# Patient Record
Sex: FEMALE | Race: WHITE | NOT HISPANIC OR LATINO | Employment: FULL TIME | URBAN - METROPOLITAN AREA
[De-identification: names, ages, dates, MRNs, and addresses within clinical notes are randomized per-mention and may not be internally consistent; named-entity substitution may affect disease eponyms.]

---

## 2023-05-25 ENCOUNTER — OFFICE VISIT (OUTPATIENT)
Dept: FAMILY MEDICINE CLINIC | Facility: CLINIC | Age: 58
End: 2023-05-25
Payer: COMMERCIAL

## 2023-05-25 VITALS
HEIGHT: 69 IN | DIASTOLIC BLOOD PRESSURE: 110 MMHG | TEMPERATURE: 97 F | WEIGHT: 202.4 LBS | SYSTOLIC BLOOD PRESSURE: 180 MMHG | HEART RATE: 72 BPM | BODY MASS INDEX: 29.98 KG/M2 | OXYGEN SATURATION: 96 %

## 2023-05-25 DIAGNOSIS — I10 PRIMARY HYPERTENSION: Primary | ICD-10-CM

## 2023-05-25 DIAGNOSIS — Z11.59 NEED FOR HEPATITIS C SCREENING TEST: ICD-10-CM

## 2023-05-25 LAB — HCV AB SER DONR QL: NORMAL

## 2023-05-25 PROCEDURE — 86803 HEPATITIS C AB TEST: CPT | Performed by: FAMILY MEDICINE

## 2023-05-25 PROCEDURE — 80053 COMPREHEN METABOLIC PANEL: CPT | Performed by: FAMILY MEDICINE

## 2023-05-25 PROCEDURE — 3077F SYST BP >= 140 MM HG: CPT | Performed by: FAMILY MEDICINE

## 2023-05-25 PROCEDURE — 84443 ASSAY THYROID STIM HORMONE: CPT | Performed by: FAMILY MEDICINE

## 2023-05-25 PROCEDURE — 3080F DIAST BP >= 90 MM HG: CPT | Performed by: FAMILY MEDICINE

## 2023-05-25 PROCEDURE — 85025 COMPLETE CBC W/AUTO DIFF WBC: CPT | Performed by: FAMILY MEDICINE

## 2023-05-25 PROCEDURE — 80061 LIPID PANEL: CPT | Performed by: FAMILY MEDICINE

## 2023-05-25 RX ORDER — AMLODIPINE BESYLATE 5 MG/1
5 TABLET ORAL DAILY
Qty: 90 TABLET | Refills: 0 | Status: SHIPPED | OUTPATIENT
Start: 2023-05-25

## 2023-05-25 NOTE — ASSESSMENT & PLAN NOTE
Blood pressure somewhat elevated here today.  We will recheck it 1 more time.  Upon recheck her blood pressure remained is equally as bad.  We will add some amlodipine onto her irbesartan.

## 2023-05-25 NOTE — PROGRESS NOTES
Chief Complaint   Patient presents with   • Follow-up     1 month    • Hypertension        Subjective     Amber Philip  has a past medical history of Hypertension.    Hypertension- she states she does check her blood pressure at home intermittently.  Most recently her systolic blood pressure is 150.  Is a little bit higher here today at 173/105.  She is taking her medication daily.      PHQ-2 Depression Screening  Little interest or pleasure in doing things?     Feeling down, depressed, or hopeless?     PHQ-2 Total Score     PHQ-9 Depression Screening  Little interest or pleasure in doing things?     Feeling down, depressed, or hopeless?     Trouble falling or staying asleep, or sleeping too much?     Feeling tired or having little energy?     Poor appetite or overeating?     Feeling bad about yourself - or that you are a failure or have let yourself or your family down?     Trouble concentrating on things, such as reading the newspaper or watching television?     Moving or speaking so slowly that other people could have noticed? Or the opposite - being so fidgety or restless that you have been moving around a lot more than usual?     Thoughts that you would be better off dead, or of hurting yourself in some way?     PHQ-9 Total Score     If you checked off any problems, how difficult have these problems made it for you to do your work, take care of things at home, or get along with other people?       No Known Allergies    Prior to Admission medications    Medication Sig Start Date End Date Taking? Authorizing Provider   irbesartan (Avapro) 150 MG tablet Take 1 tablet by mouth Daily. 4/20/23  Yes Cuong Hickman, DO        Patient Active Problem List   Diagnosis   • Encounter for medical examination to establish care   • Hypertension   • Screening for colon cancer   • Encounter for screening mammogram for malignant neoplasm of breast   • Tobacco abuse        History reviewed. No pertinent surgical  "history.    Social History     Socioeconomic History   • Marital status: Single   Tobacco Use   • Smoking status: Every Day     Packs/day: 0.50     Years: 30.00     Pack years: 15.00     Types: Cigarettes   Vaping Use   • Vaping Use: Never used   Substance and Sexual Activity   • Alcohol use: Yes     Alcohol/week: 7.0 standard drinks     Types: 7 Standard drinks or equivalent per week     Comment: weekends   • Drug use: Never   • Sexual activity: Defer       History reviewed. No pertinent family history.    Family history, surgical history, past medical history, Allergies and meds reviewed with patient today and updated in [facility] EMR.     ROS:  Review of Systems   Constitutional: Negative for fatigue.   Respiratory: Negative for cough, chest tightness, shortness of breath and wheezing.    Cardiovascular: Negative for chest pain and palpitations.   Neurological: Negative for headache.       OBJECTIVE:  Vitals:    05/25/23 1431 05/25/23 1445   BP: (!) 173/105 (!) 180/110   BP Location: Left arm Left arm   Patient Position: Sitting Sitting   Cuff Size:  Adult   Pulse: 72    Temp: 97 °F (36.1 °C)    SpO2: 96%    Weight: 91.8 kg (202 lb 6.4 oz)    Height: 175.3 cm (69\")      No results found.   Body mass index is 29.89 kg/m².  No LMP recorded. Patient is postmenopausal.    Physical Exam  Vitals and nursing note reviewed.   Constitutional:       General: She is not in acute distress.     Appearance: Normal appearance. She is obese.   HENT:      Head: Normocephalic.   Cardiovascular:      Rate and Rhythm: Normal rate and regular rhythm.      Heart sounds: Normal heart sounds. No murmur heard.  Pulmonary:      Effort: Pulmonary effort is normal.      Breath sounds: Normal breath sounds. No wheezing.   Neurological:      Mental Status: She is alert.         Procedures    No visits with results within 30 Day(s) from this visit.   Latest known visit with results is:   Admission on 09/29/2021, Discharged on 09/29/2021   Component " Date Value Ref Range Status   • SARS Antigen 09/29/2021 Detected (A)  Not Detected Final   • Internal Control 09/29/2021 Passed  Passed Final   • Lot Number 09/29/2021 [id number]   Final   • Expiration Date 09/29/2021 03/03/2023   Final       ASSESSMENT/ PLAN:    Diagnoses and all orders for this visit:    1. Primary hypertension (Primary)  Assessment & Plan:  Blood pressure somewhat elevated here today.  We will recheck it 1 more time.  Upon recheck her blood pressure remained is equally as bad.  We will add some amlodipine onto her irbesartan.        Orders Placed Today:     No orders of the defined types were placed in this encounter.       Management Plan:     An After Visit Summary was printed and given to the patient at discharge.    Follow-up: Return in about 4 weeks (around 6/22/2023) for Recheck.    Cuong Hickman DO 5/25/2023 14:49 EDT  This note was electronically signed.

## 2023-05-26 DIAGNOSIS — Z00.00 ROUTINE CHECK-UP: Primary | ICD-10-CM

## 2024-05-06 ENCOUNTER — OFFICE VISIT (OUTPATIENT)
Dept: FAMILY MEDICINE CLINIC | Facility: CLINIC | Age: 59
End: 2024-05-06
Payer: COMMERCIAL

## 2024-05-06 VITALS
OXYGEN SATURATION: 99 % | SYSTOLIC BLOOD PRESSURE: 199 MMHG | WEIGHT: 196 LBS | TEMPERATURE: 97.3 F | BODY MASS INDEX: 29.03 KG/M2 | HEIGHT: 69 IN | DIASTOLIC BLOOD PRESSURE: 140 MMHG | HEART RATE: 78 BPM

## 2024-05-06 DIAGNOSIS — I10 PRIMARY HYPERTENSION: Primary | ICD-10-CM

## 2024-05-06 PROCEDURE — 99213 OFFICE O/P EST LOW 20 MIN: CPT | Performed by: FAMILY MEDICINE

## 2024-05-06 RX ORDER — IRBESARTAN 300 MG/1
300 TABLET ORAL DAILY
Qty: 90 TABLET | Refills: 0 | Status: SHIPPED | OUTPATIENT
Start: 2024-05-06

## 2024-05-06 RX ORDER — AMLODIPINE BESYLATE 5 MG/1
5 TABLET ORAL DAILY
Qty: 90 TABLET | Refills: 0 | Status: SHIPPED | OUTPATIENT
Start: 2024-05-06

## 2024-06-06 ENCOUNTER — OFFICE VISIT (OUTPATIENT)
Dept: FAMILY MEDICINE CLINIC | Facility: CLINIC | Age: 59
End: 2024-06-06
Payer: COMMERCIAL

## 2024-06-06 VITALS
TEMPERATURE: 97.6 F | SYSTOLIC BLOOD PRESSURE: 150 MMHG | WEIGHT: 197 LBS | DIASTOLIC BLOOD PRESSURE: 90 MMHG | OXYGEN SATURATION: 98 % | HEIGHT: 69 IN | HEART RATE: 80 BPM | BODY MASS INDEX: 29.18 KG/M2

## 2024-06-06 DIAGNOSIS — I10 PRIMARY HYPERTENSION: Primary | ICD-10-CM

## 2024-06-06 PROCEDURE — 99213 OFFICE O/P EST LOW 20 MIN: CPT | Performed by: FAMILY MEDICINE

## 2024-06-06 RX ORDER — AMLODIPINE BESYLATE 10 MG/1
10 TABLET ORAL DAILY
Qty: 90 TABLET | Refills: 0 | Status: SHIPPED | OUTPATIENT
Start: 2024-06-06

## 2024-06-06 NOTE — ASSESSMENT & PLAN NOTE
Her blood pressure is improved although still somewhat elevated here today.  Will recheck it 1 more time.

## 2024-06-06 NOTE — PROGRESS NOTES
Chief Complaint   Patient presents with    Follow-up     4 week follow up     Hypertension        Subjective     Amber Jurado  has a past medical history of Hypertension.    Hypertension  This is a chronic problem. The current episode started more than 1 year ago. The problem has been improved since onset. The problem is uncontrolled. Pertinent negatives include no chest pain, headaches, palpitations or shortness of breath. Risk factors for coronary artery disease include obesity and post-menopausal state. Current antihypertension treatment includes angiotensin blockers and calcium channel blockers. The current treatment provides moderate improvement. There are no compliance problems.        PHQ-2 Depression Screening  Little interest or pleasure in doing things?     Feeling down, depressed, or hopeless?     PHQ-2 Total Score     PHQ-9 Depression Screening  Little interest or pleasure in doing things?     Feeling down, depressed, or hopeless?     Trouble falling or staying asleep, or sleeping too much?     Feeling tired or having little energy?     Poor appetite or overeating?     Feeling bad about yourself - or that you are a failure or have let yourself or your family down?     Trouble concentrating on things, such as reading the newspaper or watching television?     Moving or speaking so slowly that other people could have noticed? Or the opposite - being so fidgety or restless that you have been moving around a lot more than usual?     Thoughts that you would be better off dead, or of hurting yourself in some way?     PHQ-9 Total Score     If you checked off any problems, how difficult have these problems made it for you to do your work, take care of things at home, or get along with other people?       No Known Allergies    Prior to Admission medications    Medication Sig Start Date End Date Taking? Authorizing Provider   amLODIPine (NORVASC) 5 MG tablet Take 1 tablet by mouth Daily. 5/6/24  Yes Marylou  "Cuong Saldivar DO   irbesartan (Avapro) 300 MG tablet Take 1 tablet by mouth Daily. 5/6/24  Yes Cuong Hickman DO        Patient Active Problem List   Diagnosis    Encounter for medical examination to establish care    Hypertension    Screening for colon cancer    Encounter for screening mammogram for malignant neoplasm of breast    Tobacco abuse        History reviewed. No pertinent surgical history.    Social History     Socioeconomic History    Marital status: Single   Tobacco Use    Smoking status: Every Day     Current packs/day: 0.50     Average packs/day: 0.5 packs/day for 30.0 years (15.0 ttl pk-yrs)     Types: Cigarettes    Smokeless tobacco: Never   Vaping Use    Vaping status: Never Used   Substance and Sexual Activity    Alcohol use: Yes     Alcohol/week: 7.0 standard drinks of alcohol     Types: 7 Standard drinks or equivalent per week     Comment: weekends    Drug use: Never    Sexual activity: Defer       History reviewed. No pertinent family history.    Family history, surgical history, past medical history, Allergies and meds reviewed with patient today and updated in [employer] EMR.     ROS:  Review of Systems   Constitutional:  Negative for fatigue.   Respiratory:  Negative for cough, chest tightness, shortness of breath and wheezing.    Cardiovascular:  Negative for chest pain and palpitations.   Neurological:  Negative for headache.       OBJECTIVE:  Vitals:    06/06/24 1129 06/06/24 1149   BP: 157/75 150/90   BP Location: Left arm Left arm   Patient Position: Sitting Sitting   Cuff Size:  Adult   Pulse: 80    Temp: 97.6 °F (36.4 °C)    SpO2: 98%    Weight: 89.4 kg (197 lb)    Height: 175.3 cm (69\")      No results found.   Body mass index is 29.09 kg/m².  No LMP recorded. Patient is postmenopausal.    The 10-year ASCVD risk score (Vasquez CHAVEZ, et al., 2019) is: 13.5%    Values used to calculate the score:      Age: 58 years      Sex: Female      Is Non- : No      " Diabetic: No      Tobacco smoker: Yes      Systolic Blood Pressure: 150 mmHg      Is BP treated: Yes      HDL Cholesterol: 34 mg/dL      Total Cholesterol: 190 mg/dL     Physical Exam  Vitals and nursing note reviewed.   Constitutional:       General: She is not in acute distress.     Appearance: Normal appearance. She is obese.   HENT:      Head: Normocephalic.   Cardiovascular:      Rate and Rhythm: Normal rate and regular rhythm.      Heart sounds: Normal heart sounds. No murmur heard.  Pulmonary:      Effort: Pulmonary effort is normal.      Breath sounds: Normal breath sounds. No wheezing or rhonchi.   Neurological:      Mental Status: She is alert and oriented to person, place, and time.   Psychiatric:         Mood and Affect: Mood normal.         Thought Content: Thought content normal.         Judgment: Judgment normal.         Procedures    No visits with results within 30 Day(s) from this visit.   Latest known visit with results is:   Office Visit on 05/25/2023   Component Date Value Ref Range Status    Hepatitis C Ab 05/25/2023 Non-Reactive  Non-Reactive Final       ASSESSMENT/ PLAN:    Diagnoses and all orders for this visit:    1. Primary hypertension (Primary)  Assessment & Plan:  Her blood pressure is improved although still somewhat elevated here today.  Will recheck it 1 more time.          BMI is >= 25 and <30. (Overweight) The following options were offered after discussion;: exercise counseling/recommendations and nutrition counseling/recommendations      Orders Placed Today:     No orders of the defined types were placed in this encounter.       Management Plan:     An After Visit Summary was printed and given to the patient at discharge.    Follow-up: Return in about 2 months (around 8/6/2024) for Recheck.    Cuong Hickman DO 6/6/2024 11:51 EDT  This note was electronically signed.

## 2024-07-01 ENCOUNTER — TELEPHONE (OUTPATIENT)
Dept: FAMILY MEDICINE CLINIC | Facility: CLINIC | Age: 59
End: 2024-07-01

## 2024-07-01 RX ORDER — ALPRAZOLAM 0.25 MG/1
0.25 TABLET ORAL 2 TIMES DAILY PRN
Qty: 10 TABLET | Refills: 0 | Status: SHIPPED | OUTPATIENT
Start: 2024-07-01

## 2024-07-01 RX ORDER — ONDANSETRON 4 MG/1
4 TABLET, ORALLY DISINTEGRATING ORAL EVERY 8 HOURS PRN
Qty: 20 TABLET | Refills: 0 | Status: SHIPPED | OUTPATIENT
Start: 2024-07-01

## 2024-07-01 NOTE — TELEPHONE ENCOUNTER
Caller: SAY LAMA    Relationship:     Best call back number: [phone]     What medication are you requesting: XANAX    What are your current symptoms: NAUSEA VOMITING, PATIENTS  WAS KILLED THIS WEEK AT [facility]    How long have you been experiencing symptoms: 1 WEEK    Have you had these symptoms before:    [] Yes  [] No    Have you been treated for these symptoms before:   [] Yes  [] No    If a prescription is needed, what is your preferred pharmacy and phone number: [facility] [address] [place] [address] [address] [facility] [phone] [facility] [phone] FX      Additional notes:PATIENTS SISTER CALLED STATING THE PATIENT IS GOING THROUGH A ROUGH TIME AND NEEDS SOMETHING TO GET THE PATIENT THROUGH THIS HARD TIME.     PLEASE CALL TO CONFIRM

## 2024-07-09 RX ORDER — ALPRAZOLAM 0.25 MG/1
0.25 TABLET ORAL 2 TIMES DAILY PRN
Qty: 10 TABLET | Refills: 0 | OUTPATIENT
Start: 2024-07-09

## 2024-07-16 ENCOUNTER — OFFICE VISIT (OUTPATIENT)
Dept: FAMILY MEDICINE CLINIC | Facility: CLINIC | Age: 59
End: 2024-07-16
Payer: COMMERCIAL

## 2024-07-16 VITALS
TEMPERATURE: 98 F | SYSTOLIC BLOOD PRESSURE: 143 MMHG | HEIGHT: 69 IN | OXYGEN SATURATION: 96 % | HEART RATE: 120 BPM | BODY MASS INDEX: 27.25 KG/M2 | DIASTOLIC BLOOD PRESSURE: 94 MMHG | WEIGHT: 184 LBS

## 2024-07-16 DIAGNOSIS — J40 BRONCHITIS: ICD-10-CM

## 2024-07-16 DIAGNOSIS — I10 PRIMARY HYPERTENSION: Primary | ICD-10-CM

## 2024-07-16 DIAGNOSIS — F41.8 SITUATIONAL ANXIETY: ICD-10-CM

## 2024-07-16 LAB
AMPHET+METHAMPHET UR QL: NEGATIVE
AMPHETAMINE INTERNAL CONTROL: ABNORMAL
AMPHETAMINES UR QL: NEGATIVE
BARBITURATE INTERNAL CONTROL: ABNORMAL
BARBITURATES UR QL SCN: NEGATIVE
BENZODIAZ UR QL SCN: NEGATIVE
BENZODIAZEPINE INTERNAL CONTROL: ABNORMAL
BUPRENORPHINE INTERNAL CONTROL: ABNORMAL
BUPRENORPHINE SERPL-MCNC: NEGATIVE NG/ML
CANNABINOIDS SERPL QL: POSITIVE
COCAINE INTERNAL CONTROL: ABNORMAL
COCAINE UR QL: NEGATIVE
EXPIRATION DATE: ABNORMAL
Lab: ABNORMAL
MDMA (ECSTASY) INTERNAL CONTROL: ABNORMAL
MDMA UR QL SCN: NEGATIVE
METHADONE INTERNAL CONTROL: ABNORMAL
METHADONE UR QL SCN: NEGATIVE
METHAMPHETAMINE INTERNAL CONTROL: ABNORMAL
MORPHINE INTERNAL CONTROL: ABNORMAL
MORPHINE/OPIATES SCREEN, URINE: NEGATIVE
OXYCODONE INTERNAL CONTROL: ABNORMAL
OXYCODONE UR QL SCN: NEGATIVE
PCP UR QL SCN: NEGATIVE
PHENCYCLIDINE INTERNAL CONTROL: ABNORMAL
THC INTERNAL CONTROL: ABNORMAL

## 2024-07-16 PROCEDURE — 80305 DRUG TEST PRSMV DIR OPT OBS: CPT | Performed by: FAMILY MEDICINE

## 2024-07-16 PROCEDURE — 99214 OFFICE O/P EST MOD 30 MIN: CPT | Performed by: FAMILY MEDICINE

## 2024-07-16 RX ORDER — HYDROCHLOROTHIAZIDE 12.5 MG/1
12.5 TABLET ORAL DAILY
Qty: 90 TABLET | Refills: 0 | Status: SHIPPED | OUTPATIENT
Start: 2024-07-16

## 2024-07-16 RX ORDER — ALPRAZOLAM 0.5 MG/1
0.5 TABLET ORAL 2 TIMES DAILY PRN
Qty: 30 TABLET | Refills: 1 | Status: SHIPPED | OUTPATIENT
Start: 2024-07-16

## 2024-07-16 NOTE — PROGRESS NOTES
Chief Complaint   Patient presents with    Follow-up     1 month     Hypertension        Subjective     Amber Jurado  has a past medical history of Hypertension.    Hypertension-she does not check her blood pressure at home.  She states her home blood pressure readings have been a little high in the 140s.    Acute anxiety-she states in July 25 her  had a work accident and tragically passed away.  She has been obviously distraught and more anxious since then.  Acutely I did give her some alprazolam.  She states that 0.25 mg was really not adequate but 0.5 mg was more effective with her acute anxiety.        PHQ-2 Depression Screening  Little interest or pleasure in doing things?     Feeling down, depressed, or hopeless?     PHQ-2 Total Score     PHQ-9 Depression Screening  Little interest or pleasure in doing things?     Feeling down, depressed, or hopeless?     Trouble falling or staying asleep, or sleeping too much?     Feeling tired or having little energy?     Poor appetite or overeating?     Feeling bad about yourself - or that you are a failure or have let yourself or your family down?     Trouble concentrating on things, such as reading the newspaper or watching television?     Moving or speaking so slowly that other people could have noticed? Or the opposite - being so fidgety or restless that you have been moving around a lot more than usual?     Thoughts that you would be better off dead, or of hurting yourself in some way?     PHQ-9 Total Score     If you checked off any problems, how difficult have these problems made it for you to do your work, take care of things at home, or get along with other people?       No Known Allergies    Prior to Admission medications    Medication Sig Start Date End Date Taking? Authorizing Provider   ALPRAZolam (XANAX) 0.25 MG tablet Take 1 tablet by mouth 2 (Two) Times a Day As Needed for Anxiety. 7/1/24  Yes Cuong Hickman, DO   amLODIPine (NORVASC) 10  MG tablet Take 1 tablet by mouth Daily. 6/6/24  Yes Cuong Hickman DO   irbesartan (Avapro) 300 MG tablet Take 1 tablet by mouth Daily. 5/6/24  Yes Cuong Hickman DO   ondansetron ODT (ZOFRAN-ODT) 4 MG disintegrating tablet Place 1 tablet on the tongue Every 8 (Eight) Hours As Needed for Nausea or Vomiting. 7/1/24  Yes Cuong Hickman DO        Patient Active Problem List   Diagnosis    Encounter for medical examination to establish care    Hypertension    Screening for colon cancer    Encounter for screening mammogram for malignant neoplasm of breast    Tobacco abuse    Bronchitis    Situational anxiety        History reviewed. No pertinent surgical history.    Social History     Socioeconomic History    Marital status: Single   Tobacco Use    Smoking status: Every Day     Current packs/day: 0.50     Average packs/day: 0.5 packs/day for 30.0 years (15.0 ttl pk-yrs)     Types: Cigarettes    Smokeless tobacco: Never   Vaping Use    Vaping status: Never Used   Substance and Sexual Activity    Alcohol use: Yes     Alcohol/week: 7.0 standard drinks of alcohol     Types: 7 Standard drinks or equivalent per week     Comment: weekends    Drug use: Never    Sexual activity: Defer       History reviewed. No pertinent family history.    Family history, surgical history, past medical history, Allergies and meds reviewed with patient today and updated in [employer] EMR.     ROS:  Review of Systems   Constitutional:  Negative for fatigue.   HENT:  Positive for congestion and postnasal drip.    Respiratory:  Positive for cough, chest tightness, shortness of breath and wheezing.    Cardiovascular:  Negative for chest pain and palpitations.   Neurological:  Negative for headache.   Psychiatric/Behavioral:  The patient is nervous/anxious.        OBJECTIVE:  Vitals:    07/16/24 0803   BP: 143/94   BP Location: Left arm   Patient Position: Sitting   Pulse: 120   Temp: 98 °F (36.7 °C)   SpO2: 96%   Weight: 83.5  "kg (184 lb)   Height: 175.3 cm (69\")     No results found.   Body mass index is 27.17 kg/m².  No LMP recorded. Patient is postmenopausal.    The 10-year ASCVD risk score (Vasquez CHAVEZ, et al., 2019) is: 12.3%    Values used to calculate the score:      Age: 58 years      Sex: Female      Is Non- : No      Diabetic: No      Tobacco smoker: Yes      Systolic Blood Pressure: 143 mmHg      Is BP treated: Yes      HDL Cholesterol: 34 mg/dL      Total Cholesterol: 190 mg/dL     Physical Exam  Vitals and nursing note reviewed.   Constitutional:       General: She is in acute distress.      Appearance: Normal appearance. She is normal weight.   HENT:      Head: Normocephalic.      Right Ear: Tympanic membrane, ear canal and external ear normal.      Left Ear: Tympanic membrane, ear canal and external ear normal.      Nose: Nose normal.      Mouth/Throat:      Mouth: Mucous membranes are moist.      Pharynx: Oropharynx is clear.   Eyes:      General: No scleral icterus.     Conjunctiva/sclera: Conjunctivae normal.      Pupils: Pupils are equal, round, and reactive to light.   Cardiovascular:      Rate and Rhythm: Normal rate and regular rhythm.      Pulses: Normal pulses.      Heart sounds: Normal heart sounds. No murmur heard.  Pulmonary:      Effort: Pulmonary effort is normal.      Breath sounds: Wheezing present. No rhonchi or rales.   Musculoskeletal:      Cervical back: Neck supple. No rigidity or tenderness.   Lymphadenopathy:      Cervical: No cervical adenopathy.   Skin:     General: Skin is warm and dry.      Coloration: Skin is not jaundiced.      Findings: No rash.   Neurological:      General: No focal deficit present.      Mental Status: She is alert and oriented to person, place, and time.   Psychiatric:         Mood and Affect: Mood normal.         Thought Content: Thought content normal.         Judgment: Judgment normal.         Procedures    No visits with results within 30 Day(s) from " this visit.   Latest known visit with results is:   Office Visit on 05/25/2023   Component Date Value Ref Range Status    Hepatitis C Ab 05/25/2023 Non-Reactive  Non-Reactive Final       ASSESSMENT/ PLAN:    Diagnoses and all orders for this visit:    1. Primary hypertension (Primary)  Assessment & Plan:  Her blood pressure remains a little elevated.  Will add some hydrochlorothiazide onto her regiment      2. Bronchitis  Assessment & Plan:  Instructed her that most bronchitis is typically tend to be viral.  Will give her a burst of steroids and an albuterol inhaler.      3. Situational anxiety  Assessment & Plan:  She remains very anxious and tearful related to her's 's sudden and unexpected death.  Will refill her alprazolam for now.  Instructed her that this should be a short-term medication that can be impairing and sedating and habit-forming as well.    Orders:  -     ALPRAZolam (XANAX) 0.5 MG tablet; Take 1 tablet by mouth 2 (Two) Times a Day As Needed for Anxiety.  Dispense: 30 tablet; Refill: 1  -     POC Medline 12 Panel Urine Drug Screen    Other orders  -     hydroCHLOROthiazide 12.5 MG tablet; Take 1 tablet by mouth Daily.  Dispense: 90 tablet; Refill: 0               Orders Placed Today:     New Medications Ordered This Visit   Medications    hydroCHLOROthiazide 12.5 MG tablet     Sig: Take 1 tablet by mouth Daily.     Dispense:  90 tablet     Refill:  0    ALPRAZolam (XANAX) 0.5 MG tablet     Sig: Take 1 tablet by mouth 2 (Two) Times a Day As Needed for Anxiety.     Dispense:  30 tablet     Refill:  1        Management Plan:     An After Visit Summary was printed and given to the patient at discharge.    Follow-up: Return in about 2 months (around 9/16/2024).    Cuong Hickman DO 7/16/2024 08:26 EDT  This note was electronically signed.

## 2024-07-16 NOTE — ASSESSMENT & PLAN NOTE
Her blood pressure remains a little elevated.  Will add some hydrochlorothiazide onto her regiment

## 2024-07-16 NOTE — ASSESSMENT & PLAN NOTE
Instructed her that most bronchitis is typically tend to be viral.  Will give her a burst of steroids and an albuterol inhaler.

## 2024-07-16 NOTE — ASSESSMENT & PLAN NOTE
She remains very anxious and tearful related to her's 's sudden and unexpected death.  Will refill her alprazolam for now.  Instructed her that this should be a short-term medication that can be impairing and sedating and habit-forming as well.

## 2024-07-17 ENCOUNTER — TELEPHONE (OUTPATIENT)
Dept: FAMILY MEDICINE CLINIC | Facility: CLINIC | Age: 59
End: 2024-07-17
Payer: COMMERCIAL

## 2024-07-17 RX ORDER — METHYLPREDNISOLONE 4 MG/1
TABLET ORAL
Qty: 21 TABLET | Refills: 0 | Status: SHIPPED | OUTPATIENT
Start: 2024-07-17

## 2024-07-17 RX ORDER — ALBUTEROL SULFATE 90 UG/1
2 AEROSOL, METERED RESPIRATORY (INHALATION) EVERY 6 HOURS PRN
Qty: 8 G | Refills: 0 | Status: SHIPPED | OUTPATIENT
Start: 2024-07-17

## 2024-07-17 NOTE — TELEPHONE ENCOUNTER
Patient called stating at her appointment yesterday you said you would send in a steriod and something for mucous and wheezing, however, they were not sent to her pharmacy.  Please advise.

## 2024-07-25 RX ORDER — IRBESARTAN 300 MG/1
300 TABLET ORAL DAILY
Qty: 90 TABLET | Refills: 0 | Status: SHIPPED | OUTPATIENT
Start: 2024-07-25

## 2024-08-13 ENCOUNTER — OFFICE VISIT (OUTPATIENT)
Dept: FAMILY MEDICINE CLINIC | Facility: CLINIC | Age: 59
End: 2024-08-13
Payer: COMMERCIAL

## 2024-08-13 VITALS
HEIGHT: 69 IN | SYSTOLIC BLOOD PRESSURE: 156 MMHG | HEART RATE: 101 BPM | DIASTOLIC BLOOD PRESSURE: 82 MMHG | TEMPERATURE: 97.2 F | BODY MASS INDEX: 26.66 KG/M2 | OXYGEN SATURATION: 98 % | WEIGHT: 180 LBS

## 2024-08-13 DIAGNOSIS — I10 PRIMARY HYPERTENSION: ICD-10-CM

## 2024-08-13 DIAGNOSIS — F41.8 SITUATIONAL ANXIETY: Primary | ICD-10-CM

## 2024-08-13 PROCEDURE — 99213 OFFICE O/P EST LOW 20 MIN: CPT | Performed by: FAMILY MEDICINE

## 2024-08-13 RX ORDER — ESCITALOPRAM OXALATE 10 MG/1
10 TABLET ORAL DAILY
Qty: 30 TABLET | Refills: 1 | Status: SHIPPED | OUTPATIENT
Start: 2024-08-13

## 2024-08-13 RX ORDER — ALPRAZOLAM 0.5 MG/1
0.5 TABLET ORAL 2 TIMES DAILY PRN
Qty: 30 TABLET | Refills: 1 | Status: SHIPPED | OUTPATIENT
Start: 2024-08-13

## 2024-08-13 RX ORDER — AMOXICILLIN 500 MG/1
1 CAPSULE ORAL 3 TIMES DAILY
COMMUNITY
Start: 2024-08-08

## 2024-08-13 NOTE — ASSESSMENT & PLAN NOTE
Her blood pressure remains elevated.  She is already on amlodipine 10 mg a day irbesartan 300 mg a day and hydrochlorothiazide 12.5.  I think a good portion of this is related to anxiety.  Will hold off on adding anything at this time to treat her anxiety.

## 2024-08-13 NOTE — PROGRESS NOTES
Chief Complaint   Patient presents with    Follow-up    Anxiety        Subjective     Amber Jurado  has a past medical history of Hypertension.    Anxiety disorder-her anxiety is rather persistent ever since her 's unexpected death.  She has not been able to return back to her house and stay overnight.  She states her symptoms are worse at nighttime.  Currently she is using the alprazolam twice daily.    Hypertension-she has not been checking her blood pressure outside the office.  It is significantly elevated here today.        PHQ-2 Depression Screening  Little interest or pleasure in doing things?     Feeling down, depressed, or hopeless?     PHQ-2 Total Score     PHQ-9 Depression Screening  Little interest or pleasure in doing things?     Feeling down, depressed, or hopeless?     Trouble falling or staying asleep, or sleeping too much?     Feeling tired or having little energy?     Poor appetite or overeating?     Feeling bad about yourself - or that you are a failure or have let yourself or your family down?     Trouble concentrating on things, such as reading the newspaper or watching television?     Moving or speaking so slowly that other people could have noticed? Or the opposite - being so fidgety or restless that you have been moving around a lot more than usual?     Thoughts that you would be better off dead, or of hurting yourself in some way?     PHQ-9 Total Score     If you checked off any problems, how difficult have these problems made it for you to do your work, take care of things at home, or get along with other people?       No Known Allergies    Prior to Admission medications    Medication Sig Start Date End Date Taking? Authorizing Provider   albuterol sulfate  (90 Base) MCG/ACT inhaler Inhale 2 puffs Every 6 (Six) Hours As Needed for Wheezing. 7/17/24  Yes Cuong Hickman, DO   ALPRAZolam (XANAX) 0.5 MG tablet Take 1 tablet by mouth 2 (Two) Times a Day As Needed for  Anxiety. 7/16/24  Yes Cuong Hickman DO   amLODIPine (NORVASC) 10 MG tablet Take 1 tablet by mouth Daily. 6/6/24  Yes Cuong Hickman DO   amoxicillin (AMOXIL) 500 MG capsule Take 1 capsule by mouth 3 times a day. 8/8/24  Yes Provider, MD Faustina   hydroCHLOROthiazide 12.5 MG tablet Take 1 tablet by mouth Daily. 7/16/24  Yes Cuong Hickman DO   irbesartan (AVAPRO) 300 MG tablet Take 1 tablet by mouth once daily 7/25/24  Yes Cuong Hickman DO   ondansetron ODT (ZOFRAN-ODT) 4 MG disintegrating tablet Place 1 tablet on the tongue Every 8 (Eight) Hours As Needed for Nausea or Vomiting. 7/1/24  Yes Cuong Hickman DO   methylPREDNISolone (MEDROL) 4 MG dose pack Take as directed on package instructions.  Patient not taking: Reported on 8/13/2024 7/17/24 8/13/24  Cuong Hickman DO        Patient Active Problem List   Diagnosis    Encounter for medical examination to establish care    Hypertension    Screening for colon cancer    Encounter for screening mammogram for malignant neoplasm of breast    Tobacco abuse    Bronchitis    Situational anxiety        History reviewed. No pertinent surgical history.    Social History     Socioeconomic History    Marital status: Single   Tobacco Use    Smoking status: Every Day     Current packs/day: 0.50     Average packs/day: 0.5 packs/day for 30.0 years (15.0 ttl pk-yrs)     Types: Cigarettes    Smokeless tobacco: Never   Vaping Use    Vaping status: Never Used   Substance and Sexual Activity    Alcohol use: Yes     Alcohol/week: 7.0 standard drinks of alcohol     Types: 7 Standard drinks or equivalent per week     Comment: weekends    Drug use: Never    Sexual activity: Defer       History reviewed. No pertinent family history.    Family history, surgical history, past medical history, Allergies and meds reviewed with patient today and updated in [employer] EMR.     ROS:  Review of Systems   Constitutional:  Negative for  "fatigue.   Respiratory:  Negative for cough, chest tightness, shortness of breath and wheezing.    Cardiovascular:  Negative for chest pain and palpitations.   Neurological:  Negative for headache.   Psychiatric/Behavioral:  Positive for sleep disturbance and depressed mood. The patient is nervous/anxious.        OBJECTIVE:  Vitals:    08/13/24 0802 08/13/24 0813   BP: (!) 169/115 156/82   BP Location: Left arm Right arm   Patient Position: Sitting Sitting   Cuff Size:  Adult   Pulse: 101    Temp: 97.2 °F (36.2 °C)    SpO2: 98%    Weight: 81.6 kg (180 lb)    Height: 175.3 cm (69\")      No results found.   Body mass index is 26.58 kg/m².  No LMP recorded. Patient is postmenopausal.    The 10-year ASCVD risk score (Vasquez CHAVEZ, et al., 2019) is: 14.5%    Values used to calculate the score:      Age: 58 years      Sex: Female      Is Non- : No      Diabetic: No      Tobacco smoker: Yes      Systolic Blood Pressure: 156 mmHg      Is BP treated: Yes      HDL Cholesterol: 34 mg/dL      Total Cholesterol: 190 mg/dL     Physical Exam  Vitals and nursing note reviewed.   Constitutional:       General: She is not in acute distress.     Appearance: Normal appearance. She is normal weight.   HENT:      Head: Normocephalic.   Cardiovascular:      Rate and Rhythm: Normal rate and regular rhythm.      Heart sounds: Normal heart sounds. No murmur heard.  Pulmonary:      Effort: Pulmonary effort is normal.      Breath sounds: Normal breath sounds. No wheezing or rhonchi.   Neurological:      Mental Status: She is alert and oriented to person, place, and time.   Psychiatric:         Mood and Affect: Mood normal.         Thought Content: Thought content normal.         Judgment: Judgment normal.         Procedures    Office Visit on 07/16/2024   Component Date Value Ref Range Status    Amphetamine Screen, Urine 07/16/2024 Negative  Negative Final    AMP INTERNAL CONTROL 07/16/2024 Passed  Passed Final    " Barbiturates Screen, Urine 07/16/2024 Negative  Negative Final    BARBITURATE INTERNAL CONTROL 07/16/2024 Passed  Passed Final    Buprenorphine, Screen, Urine 07/16/2024 Negative  Negative Final    BUPRENORPHINE INTERNAL CONTROL 07/16/2024 Passed  Passed Final    Benzodiazepine Screen, Urine 07/16/2024 Negative  Negative Final    BENZODIAZEPINE INTERNAL CONTROL 07/16/2024 Passed  Passed Final    Cocaine Screen, Urine 07/16/2024 Negative  Negative Final    COCAINE INTERNAL CONTROL 07/16/2024 Passed  Passed Final    MDMA (ECSTASY) 07/16/2024 Negative  Negative Final    MDMA (ECSTASY) INTERNAL CONTROL 07/16/2024 Passed  Passed Final    Methamphetamine, Ur 07/16/2024 Negative  Negative Final    METHAMPHETAMINE INTERNAL CONTROL 07/16/2024 Passed  Passed Final    Morphine/Opiates Screen, Urine 07/16/2024 Negative  Negative Final    MOR INTERNAL CONTROL 07/16/2024 Passed  Passed Final    Methadone Screen, Urine 07/16/2024 Negative  Negative Final    METHADONE INTERNAL CONTROL 07/16/2024 Passed  Passed Final    Oxycodone Screen, Urine 07/16/2024 Negative  Negative Final    OXYCODONE INTERNAL CONTROL 07/16/2024 Passed  Passed Final    Phencyclidine (PCP), Urine 07/16/2024 Negative  Negative Final    PHENCYCLIDINE INTERNAL CONTROL 07/16/2024 Passed  Passed Final    THC, Screen, Urine 07/16/2024 Positive (A)  Negative Final    THC INTERNAL CONTROL 07/16/2024 Passed  Passed Final    Lot Number 07/16/2024 [id number]   Final    Expiration Date 07/16/2024 11/12/2025   Final       ASSESSMENT/ PLAN:    Diagnoses and all orders for this visit:    1. Situational anxiety (Primary)  Assessment & Plan:  Her anxiety is rather persistent.  Will add something on a routine basis.  Hopefully then she can cut her alprazolam down to just only at bedtime.    Orders:  -     ALPRAZolam (XANAX) 0.5 MG tablet; Take 1 tablet by mouth 2 (Two) Times a Day As Needed for Anxiety.  Dispense: 30 tablet; Refill: 1    2. Primary hypertension  Assessment &  Plan:  Her blood pressure remains elevated.  She is already on amlodipine 10 mg a day irbesartan 300 mg a day and hydrochlorothiazide 12.5.  I think a good portion of this is related to anxiety.  Will hold off on adding anything at this time to treat her anxiety.      Other orders  -     escitalopram (Lexapro) 10 MG tablet; Take 1 tablet by mouth Daily.  Dispense: 30 tablet; Refill: 1               Orders Placed Today:     New Medications Ordered This Visit   Medications    escitalopram (Lexapro) 10 MG tablet     Sig: Take 1 tablet by mouth Daily.     Dispense:  30 tablet     Refill:  1    ALPRAZolam (XANAX) 0.5 MG tablet     Sig: Take 1 tablet by mouth 2 (Two) Times a Day As Needed for Anxiety.     Dispense:  30 tablet     Refill:  1        Management Plan:     An After Visit Summary was printed and given to the patient at discharge.    Follow-up: Return in about 4 weeks (around 9/10/2024) for Recheck.    Cuong Hickman DO 8/13/2024 08:21 EDT  This note was electronically signed.

## 2024-08-13 NOTE — ASSESSMENT & PLAN NOTE
Her anxiety is rather persistent.  Will add something on a routine basis.  Hopefully then she can cut her alprazolam down to just only at bedtime.

## 2024-09-10 ENCOUNTER — OFFICE VISIT (OUTPATIENT)
Dept: FAMILY MEDICINE CLINIC | Facility: CLINIC | Age: 59
End: 2024-09-10
Payer: COMMERCIAL

## 2024-09-10 VITALS
BODY MASS INDEX: 26.96 KG/M2 | WEIGHT: 182 LBS | TEMPERATURE: 97.4 F | HEART RATE: 92 BPM | HEIGHT: 69 IN | SYSTOLIC BLOOD PRESSURE: 165 MMHG | OXYGEN SATURATION: 98 % | DIASTOLIC BLOOD PRESSURE: 93 MMHG

## 2024-09-10 DIAGNOSIS — F41.8 SITUATIONAL ANXIETY: ICD-10-CM

## 2024-09-10 DIAGNOSIS — I10 PRIMARY HYPERTENSION: Primary | ICD-10-CM

## 2024-09-10 PROCEDURE — 99213 OFFICE O/P EST LOW 20 MIN: CPT | Performed by: FAMILY MEDICINE

## 2024-09-10 RX ORDER — AMLODIPINE BESYLATE 10 MG/1
10 TABLET ORAL DAILY
Qty: 90 TABLET | Refills: 1 | Status: SHIPPED | OUTPATIENT
Start: 2024-09-10

## 2024-09-10 RX ORDER — ALPRAZOLAM 0.5 MG
0.5 TABLET ORAL 2 TIMES DAILY PRN
Qty: 30 TABLET | Refills: 1 | Status: SHIPPED | OUTPATIENT
Start: 2024-09-10

## 2024-09-10 RX ORDER — IRBESARTAN 300 MG/1
300 TABLET ORAL DAILY
Qty: 90 TABLET | Refills: 1 | Status: SHIPPED | OUTPATIENT
Start: 2024-09-10

## 2024-09-10 RX ORDER — HYDROCHLOROTHIAZIDE 12.5 MG/1
12.5 TABLET ORAL DAILY
Qty: 90 TABLET | Refills: 1 | Status: SHIPPED | OUTPATIENT
Start: 2024-09-10

## 2024-09-10 RX ORDER — ESCITALOPRAM OXALATE 10 MG/1
10 TABLET ORAL DAILY
Qty: 90 TABLET | Refills: 1 | Status: SHIPPED | OUTPATIENT
Start: 2024-09-10

## 2024-09-10 NOTE — PROGRESS NOTES
Chief Complaint   Patient presents with    Follow-up     2 month     Hypertension        Subjective     Amber Jurado  has a past medical history of Hypertension.    Hypertension-she checks her blood pressure at home on a regular basis.  Her home blood pressure readings are typically all very good.  He remains somewhat elevated here today at 165/93.    Anxiety-her anxiety remains persistent.  She states though recently she has gone back home where she had been living with her sister.  This has provoked some increased anxiety with change in appetite and difficulty sleeping.        PHQ-2 Depression Screening  Little interest or pleasure in doing things?     Feeling down, depressed, or hopeless?     PHQ-2 Total Score     PHQ-9 Depression Screening  Little interest or pleasure in doing things?     Feeling down, depressed, or hopeless?     Trouble falling or staying asleep, or sleeping too much?     Feeling tired or having little energy?     Poor appetite or overeating?     Feeling bad about yourself - or that you are a failure or have let yourself or your family down?     Trouble concentrating on things, such as reading the newspaper or watching television?     Moving or speaking so slowly that other people could have noticed? Or the opposite - being so fidgety or restless that you have been moving around a lot more than usual?     Thoughts that you would be better off dead, or of hurting yourself in some way?     PHQ-9 Total Score     If you checked off any problems, how difficult have these problems made it for you to do your work, take care of things at home, or get along with other people?       No Known Allergies    Prior to Admission medications    Medication Sig Start Date End Date Taking? Authorizing Provider   albuterol sulfate  (90 Base) MCG/ACT inhaler Inhale 2 puffs Every 6 (Six) Hours As Needed for Wheezing. 7/17/24  Yes Cuong Hickman, DO   ALPRAZolam (XANAX) 0.5 MG tablet Take 1 tablet  by mouth 2 (Two) Times a Day As Needed for Anxiety. 8/13/24  Yes Cuong Hickman DO   amLODIPine (NORVASC) 10 MG tablet Take 1 tablet by mouth Daily. 6/6/24  Yes Cuong Hickman DO   escitalopram (Lexapro) 10 MG tablet Take 1 tablet by mouth Daily. 8/13/24  Yes Cuong Hickman DO   hydroCHLOROthiazide 12.5 MG tablet Take 1 tablet by mouth Daily. 7/16/24  Yes Cuong Hickman DO   irbesartan (AVAPRO) 300 MG tablet Take 1 tablet by mouth once daily 7/25/24  Yes Cuong Hickman DO   ondansetron ODT (ZOFRAN-ODT) 4 MG disintegrating tablet Place 1 tablet on the tongue Every 8 (Eight) Hours As Needed for Nausea or Vomiting. 7/1/24  Yes Cuong Hickman DO   amoxicillin (AMOXIL) 500 MG capsule Take 1 capsule by mouth 3 times a day.  Patient not taking: Reported on 9/10/2024 8/8/24 9/10/24  Provider, MD Faustina        Patient Active Problem List   Diagnosis    Encounter for medical examination to establish care    Hypertension    Screening for colon cancer    Encounter for screening mammogram for malignant neoplasm of breast    Tobacco abuse    Bronchitis    Situational anxiety        History reviewed. No pertinent surgical history.    Social History     Socioeconomic History    Marital status: Single   Tobacco Use    Smoking status: Every Day     Current packs/day: 0.50     Average packs/day: 0.5 packs/day for 30.0 years (15.0 ttl pk-yrs)     Types: Cigarettes    Smokeless tobacco: Never   Vaping Use    Vaping status: Never Used   Substance and Sexual Activity    Alcohol use: Yes     Alcohol/week: 7.0 standard drinks of alcohol     Types: 7 Standard drinks or equivalent per week     Comment: weekends    Drug use: Never    Sexual activity: Defer       History reviewed. No pertinent family history.    Family history, surgical history, past medical history, Allergies and meds reviewed with patient today and updated in [email or web address] EMR.     ROS:  Review of Systems  "  Constitutional:  Negative for fatigue.   Respiratory:  Negative for cough, chest tightness, shortness of breath and wheezing.    Cardiovascular:  Negative for chest pain and palpitations.   Neurological:  Negative for headache.   Psychiatric/Behavioral:  Positive for sleep disturbance. Negative for depressed mood. The patient is nervous/anxious.        OBJECTIVE:  Vitals:    09/10/24 0752   BP: 165/93   BP Location: Right arm   Patient Position: Sitting   Pulse: 92   Temp: 97.4 °F (36.3 °C)   SpO2: 98%   Weight: 82.6 kg (182 lb)   Height: 175.3 cm (69\")     No results found.   Body mass index is 26.88 kg/m².  No LMP recorded. Patient is postmenopausal.    The 10-year ASCVD risk score (Vasquez DK, et al., 2019) is: 16.8%    Values used to calculate the score:      Age: 59 years      Sex: Female      Is Non- : No      Diabetic: No      Tobacco smoker: Yes      Systolic Blood Pressure: 165 mmHg      Is BP treated: Yes      HDL Cholesterol: 34 mg/dL      Total Cholesterol: 190 mg/dL     Physical Exam  Vitals and nursing note reviewed.   Constitutional:       General: She is not in acute distress.     Appearance: Normal appearance. She is normal weight.   HENT:      Head: Normocephalic.   Cardiovascular:      Rate and Rhythm: Normal rate and regular rhythm.      Heart sounds: Normal heart sounds. No murmur heard.  Pulmonary:      Effort: Pulmonary effort is normal.      Breath sounds: Normal breath sounds. No wheezing or rhonchi.   Neurological:      Mental Status: She is alert and oriented to person, place, and time.   Psychiatric:         Mood and Affect: Mood normal.         Thought Content: Thought content normal.         Judgment: Judgment normal.         Procedures    No visits with results within 30 Day(s) from this visit.   Latest known visit with results is:   Office Visit on 07/16/2024   Component Date Value Ref Range Status    Amphetamine Screen, Urine 07/16/2024 Negative  Negative " Final    AMP INTERNAL CONTROL 07/16/2024 Passed  Passed Final    Barbiturates Screen, Urine 07/16/2024 Negative  Negative Final    BARBITURATE INTERNAL CONTROL 07/16/2024 Passed  Passed Final    Buprenorphine, Screen, Urine 07/16/2024 Negative  Negative Final    BUPRENORPHINE INTERNAL CONTROL 07/16/2024 Passed  Passed Final    Benzodiazepine Screen, Urine 07/16/2024 Negative  Negative Final    BENZODIAZEPINE INTERNAL CONTROL 07/16/2024 Passed  Passed Final    Cocaine Screen, Urine 07/16/2024 Negative  Negative Final    COCAINE INTERNAL CONTROL 07/16/2024 Passed  Passed Final    MDMA (ECSTASY) 07/16/2024 Negative  Negative Final    MDMA (ECSTASY) INTERNAL CONTROL 07/16/2024 Passed  Passed Final    Methamphetamine, Ur 07/16/2024 Negative  Negative Final    METHAMPHETAMINE INTERNAL CONTROL 07/16/2024 Passed  Passed Final    Morphine/Opiates Screen, Urine 07/16/2024 Negative  Negative Final    MOR INTERNAL CONTROL 07/16/2024 Passed  Passed Final    Methadone Screen, Urine 07/16/2024 Negative  Negative Final    METHADONE INTERNAL CONTROL 07/16/2024 Passed  Passed Final    Oxycodone Screen, Urine 07/16/2024 Negative  Negative Final    OXYCODONE INTERNAL CONTROL 07/16/2024 Passed  Passed Final    Phencyclidine (PCP), Urine 07/16/2024 Negative  Negative Final    PHENCYCLIDINE INTERNAL CONTROL 07/16/2024 Passed  Passed Final    THC, Screen, Urine 07/16/2024 Positive (A)  Negative Final    THC INTERNAL CONTROL 07/16/2024 Passed  Passed Final    Lot Number 07/16/2024 [id number]   Final    Expiration Date 07/16/2024 11/12/2025   Final       ASSESSMENT/ PLAN:    Diagnoses and all orders for this visit:    1. Primary hypertension (Primary)  Assessment & Plan:  Her home blood pressure readings are consistently very good.  Will continue her current medicines and she will continue to monitor closely.      2. Situational anxiety  Assessment & Plan:  Her anxiety is persistent.  She has only been on the Lexapro couple of weeks now.   Will watch a little bit further before adjusting her dosage.    Orders:  -     ALPRAZolam (XANAX) 0.5 MG tablet; Take 1 tablet by mouth 2 (Two) Times a Day As Needed for Anxiety.  Dispense: 30 tablet; Refill: 1    Other orders  -     amLODIPine (NORVASC) 10 MG tablet; Take 1 tablet by mouth Daily.  Dispense: 90 tablet; Refill: 1  -     escitalopram (Lexapro) 10 MG tablet; Take 1 tablet by mouth Daily.  Dispense: 90 tablet; Refill: 1  -     hydroCHLOROthiazide 12.5 MG tablet; Take 1 tablet by mouth Daily.  Dispense: 90 tablet; Refill: 1  -     irbesartan (AVAPRO) 300 MG tablet; Take 1 tablet by mouth Daily.  Dispense: 90 tablet; Refill: 1               Orders Placed Today:     New Medications Ordered This Visit   Medications    amLODIPine (NORVASC) 10 MG tablet     Sig: Take 1 tablet by mouth Daily.     Dispense:  90 tablet     Refill:  1    escitalopram (Lexapro) 10 MG tablet     Sig: Take 1 tablet by mouth Daily.     Dispense:  90 tablet     Refill:  1    hydroCHLOROthiazide 12.5 MG tablet     Sig: Take 1 tablet by mouth Daily.     Dispense:  90 tablet     Refill:  1    irbesartan (AVAPRO) 300 MG tablet     Sig: Take 1 tablet by mouth Daily.     Dispense:  90 tablet     Refill:  1    ALPRAZolam (XANAX) 0.5 MG tablet     Sig: Take 1 tablet by mouth 2 (Two) Times a Day As Needed for Anxiety.     Dispense:  30 tablet     Refill:  1        Management Plan:     An After Visit Summary was printed and given to the patient at discharge.    Follow-up: Return in about 3 months (around 12/10/2024) for Recheck.    Cuong Hickman,  9/10/2024 08:07 EDT  This note was electronically signed.

## 2024-09-10 NOTE — ASSESSMENT & PLAN NOTE
Her anxiety is persistent.  She has only been on the Lexapro couple of weeks now.  Will watch a little bit further before adjusting her dosage.

## 2024-09-10 NOTE — ASSESSMENT & PLAN NOTE
Her home blood pressure readings are consistently very good.  Will continue her current medicines and she will continue to monitor closely.

## 2024-10-04 DIAGNOSIS — F41.8 SITUATIONAL ANXIETY: ICD-10-CM

## 2024-10-07 RX ORDER — ALPRAZOLAM 0.5 MG
0.5 TABLET ORAL 2 TIMES DAILY PRN
Qty: 30 TABLET | Refills: 0 | Status: SHIPPED | OUTPATIENT
Start: 2024-10-07

## 2024-10-14 RX ORDER — HYDROCHLOROTHIAZIDE 12.5 MG/1
12.5 TABLET ORAL DAILY
Qty: 90 TABLET | Refills: 1 | Status: SHIPPED | OUTPATIENT
Start: 2024-10-14

## 2024-10-14 NOTE — TELEPHONE ENCOUNTER
[facility] IS TELLING PATIENT SHE DOES NOT HAVE A REFILL AND SHE IS OUT OF MEDICATION TODAY.    18-Mar-2020 14:02

## 2024-10-17 DIAGNOSIS — F41.8 SITUATIONAL ANXIETY: ICD-10-CM

## 2024-10-17 RX ORDER — ALPRAZOLAM 0.5 MG
0.5 TABLET ORAL 2 TIMES DAILY PRN
Qty: 30 TABLET | Refills: 0 | OUTPATIENT
Start: 2024-10-17

## 2024-10-21 DIAGNOSIS — F41.8 SITUATIONAL ANXIETY: ICD-10-CM

## 2024-10-23 NOTE — TELEPHONE ENCOUNTER
Caller: Amber Jurado    Relationship to patient: Self    Best call back number: [phone]    Patient is needing: PATIENT CALLING IN FOR UPDATE ABOUT THIS, LET PATIENT KNOW THERE IS A REQUEST THAT IS PENDING.

## 2024-10-24 DIAGNOSIS — F41.8 SITUATIONAL ANXIETY: ICD-10-CM

## 2024-10-24 RX ORDER — ALPRAZOLAM 0.5 MG
0.5 TABLET ORAL 2 TIMES DAILY PRN
Qty: 30 TABLET | Refills: 0 | Status: SHIPPED | OUTPATIENT
Start: 2024-10-24

## 2024-10-24 RX ORDER — ALPRAZOLAM 0.5 MG
0.5 TABLET ORAL 2 TIMES DAILY PRN
Qty: 30 TABLET | Refills: 0 | OUTPATIENT
Start: 2024-10-24

## 2024-11-06 DIAGNOSIS — F41.8 SITUATIONAL ANXIETY: ICD-10-CM

## 2024-11-13 RX ORDER — ALPRAZOLAM 0.5 MG
0.5 TABLET ORAL 2 TIMES DAILY PRN
Qty: 30 TABLET | Refills: 0 | Status: SHIPPED | OUTPATIENT
Start: 2024-11-13

## 2024-11-27 DIAGNOSIS — F41.8 SITUATIONAL ANXIETY: ICD-10-CM

## 2024-12-02 DIAGNOSIS — F41.8 SITUATIONAL ANXIETY: ICD-10-CM

## 2024-12-02 RX ORDER — ALPRAZOLAM 0.5 MG
0.5 TABLET ORAL 2 TIMES DAILY PRN
Qty: 30 TABLET | Refills: 0 | Status: SHIPPED | OUTPATIENT
Start: 2024-12-02

## 2024-12-10 ENCOUNTER — OFFICE VISIT (OUTPATIENT)
Dept: FAMILY MEDICINE CLINIC | Facility: CLINIC | Age: 59
End: 2024-12-10
Payer: COMMERCIAL

## 2024-12-10 VITALS
TEMPERATURE: 97.1 F | WEIGHT: 197 LBS | HEIGHT: 69 IN | SYSTOLIC BLOOD PRESSURE: 138 MMHG | BODY MASS INDEX: 29.18 KG/M2 | HEART RATE: 105 BPM | DIASTOLIC BLOOD PRESSURE: 70 MMHG | OXYGEN SATURATION: 98 %

## 2024-12-10 DIAGNOSIS — F41.8 SITUATIONAL ANXIETY: ICD-10-CM

## 2024-12-10 DIAGNOSIS — I10 PRIMARY HYPERTENSION: Primary | ICD-10-CM

## 2024-12-10 PROCEDURE — 99214 OFFICE O/P EST MOD 30 MIN: CPT | Performed by: FAMILY MEDICINE

## 2024-12-10 RX ORDER — ALPRAZOLAM 0.5 MG
0.5 TABLET ORAL 2 TIMES DAILY PRN
Qty: 30 TABLET | Refills: 0 | OUTPATIENT
Start: 2024-12-10

## 2024-12-10 RX ORDER — ESCITALOPRAM OXALATE 20 MG/1
20 TABLET ORAL DAILY
Qty: 90 TABLET | Refills: 1 | Status: SHIPPED | OUTPATIENT
Start: 2024-12-10

## 2024-12-10 RX ORDER — ALPRAZOLAM 0.5 MG
0.5 TABLET ORAL 2 TIMES DAILY PRN
Qty: 60 TABLET | Refills: 1 | Status: SHIPPED | OUTPATIENT
Start: 2024-12-10

## 2024-12-10 NOTE — ASSESSMENT & PLAN NOTE
Her anxiety is rather persistent.  We cautioned her about long-term use of the alprazolam.  Will try increasing up her Lexapro to 20 mg daily and see if he can control her anxiety better overall and hopefully titrate her off her alprazolam after the holidays

## 2024-12-10 NOTE — PROGRESS NOTES
Chief Complaint   Patient presents with    Follow-up     3 mo f/u    Anxiety    Hypertension        Subjective     Amber Jurado  has a past medical history of Hypertension.    Hypertension-she does check her blood pressure at home.  She states her home blood pressure readings are typically good.  Typically in the 130s over 80s.  Is little elevated here today at 144/92.    Anxiety-her anxiety is persistent.  She states that with the holidays though it is a little bit worse.  She typically takes the alprazolam twice daily.  She is still doing the escitalopram 10 mg daily.        PHQ-2 Depression Screening  Little interest or pleasure in doing things?     Feeling down, depressed, or hopeless?     PHQ-2 Total Score     PHQ-9 Depression Screening  Little interest or pleasure in doing things?     Feeling down, depressed, or hopeless?     Trouble falling or staying asleep, or sleeping too much?     Feeling tired or having little energy?     Poor appetite or overeating?     Feeling bad about yourself - or that you are a failure or have let yourself or your family down?     Trouble concentrating on things, such as reading the newspaper or watching television?     Moving or speaking so slowly that other people could have noticed? Or the opposite - being so fidgety or restless that you have been moving around a lot more than usual?     Thoughts that you would be better off dead, or of hurting yourself in some way?     PHQ-9 Total Score     If you checked off any problems, how difficult have these problems made it for you to do your work, take care of things at home, or get along with other people?       No Known Allergies    Prior to Admission medications    Medication Sig Start Date End Date Taking? Authorizing Provider   albuterol sulfate  (90 Base) MCG/ACT inhaler Inhale 2 puffs Every 6 (Six) Hours As Needed for Wheezing. 7/17/24  Yes Cuong Hickman, DO   ALPRAZolam (XANAX) 0.5 MG tablet Take 1 tablet by  mouth twice daily as needed for anxiety 12/2/24  Yes Cuong Hickman DO   amLODIPine (NORVASC) 10 MG tablet Take 1 tablet by mouth Daily. 9/10/24  Yes Cuong Hickman DO   escitalopram (Lexapro) 10 MG tablet Take 1 tablet by mouth Daily. 9/10/24  Yes Cuong Hickman DO   hydroCHLOROthiazide 12.5 MG tablet Take 1 tablet by mouth Daily. 10/14/24  Yes Cuong Hickman DO   irbesartan (AVAPRO) 300 MG tablet Take 1 tablet by mouth Daily. 9/10/24  Yes Cuong Hickman DO   ondansetron ODT (ZOFRAN-ODT) 4 MG disintegrating tablet Place 1 tablet on the tongue Every 8 (Eight) Hours As Needed for Nausea or Vomiting. 7/1/24  Yes Cuong Hickman DO        Patient Active Problem List   Diagnosis    Encounter for medical examination to establish care    Hypertension    Screening for colon cancer    Encounter for screening mammogram for malignant neoplasm of breast    Tobacco abuse    Bronchitis    Situational anxiety        No past surgical history on file.    Social History     Socioeconomic History    Marital status: Single   Tobacco Use    Smoking status: Every Day     Current packs/day: 0.50     Average packs/day: 0.5 packs/day for 30.0 years (15.0 ttl pk-yrs)     Types: Cigarettes    Smokeless tobacco: Never   Vaping Use    Vaping status: Never Used   Substance and Sexual Activity    Alcohol use: Yes     Alcohol/week: 7.0 standard drinks of alcohol     Types: 7 Standard drinks or equivalent per week     Comment: weekends    Drug use: Never    Sexual activity: Defer       History reviewed. No pertinent family history.    Family history, surgical history, past medical history, Allergies and meds reviewed with patient today and updated in [facility] EMR.     ROS:  Review of Systems   Constitutional:  Negative for fatigue.   Respiratory:  Negative for cough, chest tightness, shortness of breath and wheezing.    Cardiovascular:  Negative for chest pain and palpitations.  "  Psychiatric/Behavioral:  Negative for depressed mood. The patient is nervous/anxious.        OBJECTIVE:  Vitals:    12/10/24 0850 12/10/24 0940   BP: 144/92 138/70   BP Location: Right arm Right arm   Patient Position: Sitting Sitting   Cuff Size: Large Adult Adult   Pulse: 105    Temp: 97.1 °F (36.2 °C)    TempSrc: Temporal    SpO2: 98%    Weight: 89.4 kg (197 lb)    Height: 175.3 cm (69\")      No results found.   Body mass index is 29.09 kg/m².  No LMP recorded. Patient is postmenopausal.    The 10-year ASCVD risk score (Vasquez CHAVEZ, et al., 2019) is: 12%    Values used to calculate the score:      Age: 59 years      Sex: Female      Is Non- : No      Diabetic: No      Tobacco smoker: Yes      Systolic Blood Pressure: 138 mmHg      Is BP treated: Yes      HDL Cholesterol: 34 mg/dL      Total Cholesterol: 190 mg/dL     Physical Exam  Vitals and nursing note reviewed.   Constitutional:       General: She is not in acute distress.     Appearance: Normal appearance. She is obese.   HENT:      Head: Normocephalic.   Cardiovascular:      Rate and Rhythm: Normal rate and regular rhythm.      Heart sounds: Normal heart sounds. No murmur heard.  Pulmonary:      Effort: Pulmonary effort is normal.      Breath sounds: Normal breath sounds. No wheezing or rhonchi.   Neurological:      Mental Status: She is alert and oriented to person, place, and time.   Psychiatric:         Mood and Affect: Mood normal.         Behavior: Behavior normal.         Thought Content: Thought content normal.         Judgment: Judgment normal.         Procedures    No visits with results within 30 Day(s) from this visit.   Latest known visit with results is:   Office Visit on 07/16/2024   Component Date Value Ref Range Status    Amphetamine Screen, Urine 07/16/2024 Negative  Negative Final    AMP INTERNAL CONTROL 07/16/2024 Passed  Passed Final    Barbiturates Screen, Urine 07/16/2024 Negative  Negative Final    BARBITURATE " INTERNAL CONTROL 07/16/2024 Passed  Passed Final    Buprenorphine, Screen, Urine 07/16/2024 Negative  Negative Final    BUPRENORPHINE INTERNAL CONTROL 07/16/2024 Passed  Passed Final    Benzodiazepine Screen, Urine 07/16/2024 Negative  Negative Final    BENZODIAZEPINE INTERNAL CONTROL 07/16/2024 Passed  Passed Final    Cocaine Screen, Urine 07/16/2024 Negative  Negative Final    COCAINE INTERNAL CONTROL 07/16/2024 Passed  Passed Final    MDMA (ECSTASY) 07/16/2024 Negative  Negative Final    MDMA (ECSTASY) INTERNAL CONTROL 07/16/2024 Passed  Passed Final    Methamphetamine, Ur 07/16/2024 Negative  Negative Final    METHAMPHETAMINE INTERNAL CONTROL 07/16/2024 Passed  Passed Final    Morphine/Opiates Screen, Urine 07/16/2024 Negative  Negative Final    MOR INTERNAL CONTROL 07/16/2024 Passed  Passed Final    Methadone Screen, Urine 07/16/2024 Negative  Negative Final    METHADONE INTERNAL CONTROL 07/16/2024 Passed  Passed Final    Oxycodone Screen, Urine 07/16/2024 Negative  Negative Final    OXYCODONE INTERNAL CONTROL 07/16/2024 Passed  Passed Final    Phencyclidine (PCP), Urine 07/16/2024 Negative  Negative Final    PHENCYCLIDINE INTERNAL CONTROL 07/16/2024 Passed  Passed Final    THC, Screen, Urine 07/16/2024 Positive (A)  Negative Final    THC INTERNAL CONTROL 07/16/2024 Passed  Passed Final    Lot Number 07/16/2024 [id number]   Final    Expiration Date 07/16/2024 11/12/2025   Final       ASSESSMENT/ PLAN:    Diagnoses and all orders for this visit:    1. Primary hypertension (Primary)  Assessment & Plan:  Her blood pressure is a little elevated here today.  Her home blood pressure readings are better.  Will recheck it 1 more time      2. Situational anxiety  Assessment & Plan:  Her anxiety is rather persistent.  We cautioned her about long-term use of the alprazolam.  Will try increasing up her Lexapro to 20 mg daily and see if he can control her anxiety better overall and hopefully titrate her off her alprazolam  after the holidays    Orders:  -     ALPRAZolam (XANAX) 0.5 MG tablet; Take 1 tablet by mouth 2 (Two) Times a Day As Needed for Anxiety. for anxiety  Dispense: 60 tablet; Refill: 1    Other orders  -     escitalopram (Lexapro) 20 MG tablet; Take 1 tablet by mouth Daily.  Dispense: 90 tablet; Refill: 1               Orders Placed Today:     New Medications Ordered This Visit   Medications    escitalopram (Lexapro) 20 MG tablet     Sig: Take 1 tablet by mouth Daily.     Dispense:  90 tablet     Refill:  1    ALPRAZolam (XANAX) 0.5 MG tablet     Sig: Take 1 tablet by mouth 2 (Two) Times a Day As Needed for Anxiety. for anxiety     Dispense:  60 tablet     Refill:  1        Management Plan:     An After Visit Summary was printed and given to the patient at discharge.    Follow-up: Return in about 3 months (around 3/10/2025) for Recheck.    Cuong Hickman DO 12/10/2024 09:42 EST  This note was electronically signed.

## 2024-12-10 NOTE — ASSESSMENT & PLAN NOTE
Her blood pressure is a little elevated here today.  Her home blood pressure readings are better.  Will recheck it 1 more time

## 2025-02-11 DIAGNOSIS — F41.8 SITUATIONAL ANXIETY: ICD-10-CM

## 2025-02-11 RX ORDER — ALPRAZOLAM 0.5 MG
0.5 TABLET ORAL 2 TIMES DAILY PRN
Qty: 60 TABLET | Refills: 0 | Status: SHIPPED | OUTPATIENT
Start: 2025-02-11

## 2025-03-10 RX ORDER — AMLODIPINE BESYLATE 10 MG/1
10 TABLET ORAL DAILY
Qty: 90 TABLET | Refills: 0 | Status: SHIPPED | OUTPATIENT
Start: 2025-03-10

## 2025-03-12 DIAGNOSIS — F41.8 SITUATIONAL ANXIETY: ICD-10-CM

## 2025-03-12 RX ORDER — ALPRAZOLAM 0.5 MG
0.5 TABLET ORAL 2 TIMES DAILY PRN
Qty: 60 TABLET | Refills: 0 | Status: SHIPPED | OUTPATIENT
Start: 2025-03-12

## 2025-03-18 ENCOUNTER — OFFICE VISIT (OUTPATIENT)
Dept: FAMILY MEDICINE CLINIC | Facility: CLINIC | Age: 60
End: 2025-03-18
Payer: COMMERCIAL

## 2025-03-18 VITALS
OXYGEN SATURATION: 96 % | DIASTOLIC BLOOD PRESSURE: 80 MMHG | BODY MASS INDEX: 31.1 KG/M2 | TEMPERATURE: 98 F | WEIGHT: 210 LBS | HEART RATE: 132 BPM | HEIGHT: 69 IN | SYSTOLIC BLOOD PRESSURE: 121 MMHG

## 2025-03-18 DIAGNOSIS — I10 PRIMARY HYPERTENSION: Primary | ICD-10-CM

## 2025-03-18 DIAGNOSIS — Z12.11 SCREENING FOR COLON CANCER: ICD-10-CM

## 2025-03-18 DIAGNOSIS — Z12.31 ENCOUNTER FOR SCREENING MAMMOGRAM FOR MALIGNANT NEOPLASM OF BREAST: ICD-10-CM

## 2025-03-18 DIAGNOSIS — Z12.11 ENCOUNTER FOR SCREENING FOR MALIGNANT NEOPLASM OF COLON: ICD-10-CM

## 2025-03-18 DIAGNOSIS — F41.8 SITUATIONAL ANXIETY: ICD-10-CM

## 2025-03-18 LAB
ALBUMIN SERPL-MCNC: 4.3 G/DL (ref 3.5–5.2)
ALBUMIN/GLOB SERPL: 1.5 G/DL
ALP SERPL-CCNC: 83 U/L (ref 39–117)
ALT SERPL W P-5'-P-CCNC: 20 U/L (ref 1–33)
AMPHET+METHAMPHET UR QL: NEGATIVE
AMPHETAMINE INTERNAL CONTROL: ABNORMAL
AMPHETAMINES UR QL: NEGATIVE
ANION GAP SERPL CALCULATED.3IONS-SCNC: 12.9 MMOL/L (ref 5–15)
AST SERPL-CCNC: 25 U/L (ref 1–32)
BARBITURATE INTERNAL CONTROL: ABNORMAL
BARBITURATES UR QL SCN: NEGATIVE
BENZODIAZ UR QL SCN: POSITIVE
BENZODIAZEPINE INTERNAL CONTROL: ABNORMAL
BILIRUB SERPL-MCNC: 0.4 MG/DL (ref 0–1.2)
BUN SERPL-MCNC: 9 MG/DL (ref 6–20)
BUN/CREAT SERPL: 6.4 (ref 7–25)
BUPRENORPHINE INTERNAL CONTROL: ABNORMAL
BUPRENORPHINE SERPL-MCNC: NEGATIVE NG/ML
CALCIUM SPEC-SCNC: 9.2 MG/DL (ref 8.6–10.5)
CANNABINOIDS SERPL QL: POSITIVE
CHLORIDE SERPL-SCNC: 97 MMOL/L (ref 98–107)
CHOLEST SERPL-MCNC: 198 MG/DL (ref 0–200)
CO2 SERPL-SCNC: 27.1 MMOL/L (ref 22–29)
COCAINE INTERNAL CONTROL: ABNORMAL
COCAINE UR QL: NEGATIVE
CREAT SERPL-MCNC: 1.4 MG/DL (ref 0.57–1)
DEPRECATED RDW RBC AUTO: 44.1 FL (ref 37–54)
EGFRCR SERPLBLD CKD-EPI 2021: 44 ML/MIN/1.73
ERYTHROCYTE [DISTWIDTH] IN BLOOD BY AUTOMATED COUNT: 13 % (ref 12.3–15.4)
EXPIRATION DATE: ABNORMAL
GLOBULIN UR ELPH-MCNC: 2.8 GM/DL
GLUCOSE SERPL-MCNC: 93 MG/DL (ref 65–99)
HCT VFR BLD AUTO: 47.5 % (ref 34–46.6)
HDLC SERPL-MCNC: 33 MG/DL (ref 40–60)
HGB BLD-MCNC: 15.6 G/DL (ref 12–15.9)
LDLC SERPL CALC-MCNC: 137 MG/DL (ref 0–100)
LDLC/HDLC SERPL: 4.05 {RATIO}
Lab: ABNORMAL
MCH RBC QN AUTO: 29.9 PG (ref 26.6–33)
MCHC RBC AUTO-ENTMCNC: 32.8 G/DL (ref 31.5–35.7)
MCV RBC AUTO: 91 FL (ref 79–97)
MDMA (ECSTASY) INTERNAL CONTROL: ABNORMAL
MDMA UR QL SCN: NEGATIVE
METHADONE INTERNAL CONTROL: ABNORMAL
METHADONE UR QL SCN: NEGATIVE
METHAMPHETAMINE INTERNAL CONTROL: ABNORMAL
MORPHINE INTERNAL CONTROL: ABNORMAL
MORPHINE/OPIATES SCREEN, URINE: NEGATIVE
OXYCODONE INTERNAL CONTROL: ABNORMAL
OXYCODONE UR QL SCN: NEGATIVE
PCP UR QL SCN: NEGATIVE
PHENCYCLIDINE INTERNAL CONTROL: ABNORMAL
PLATELET # BLD AUTO: 313 10*3/MM3 (ref 140–450)
PMV BLD AUTO: 9.6 FL (ref 6–12)
POTASSIUM SERPL-SCNC: 4.1 MMOL/L (ref 3.5–5.2)
PROT SERPL-MCNC: 7.1 G/DL (ref 6–8.5)
RBC # BLD AUTO: 5.22 10*6/MM3 (ref 3.77–5.28)
SODIUM SERPL-SCNC: 137 MMOL/L (ref 136–145)
THC INTERNAL CONTROL: ABNORMAL
TRIGL SERPL-MCNC: 156 MG/DL (ref 0–150)
VLDLC SERPL-MCNC: 28 MG/DL (ref 5–40)
WBC NRBC COR # BLD AUTO: 8.74 10*3/MM3 (ref 3.4–10.8)

## 2025-03-18 PROCEDURE — 80061 LIPID PANEL: CPT | Performed by: FAMILY MEDICINE

## 2025-03-18 PROCEDURE — 80053 COMPREHEN METABOLIC PANEL: CPT | Performed by: FAMILY MEDICINE

## 2025-03-18 PROCEDURE — 85027 COMPLETE CBC AUTOMATED: CPT | Performed by: FAMILY MEDICINE

## 2025-03-18 NOTE — PROGRESS NOTES
Chief Complaint   Patient presents with    Follow-up     3 month     Hypertension        Subjective     Amber Jurado  has a past medical history of Hypertension.    Anxiety disorder-she is still struggling with her anxiety.  She states is worse with even attempting to go out into public places.  She states at this point in time with her anxiety she does not feel that she can go with any less.  She still has some difficulty sleeping.    Hypertension  This is a chronic problem. The current episode started more than 1 year ago. The problem is unchanged. The problem is controlled. Associated symptoms include anxiety. Pertinent negatives include no blurred vision, chest pain, palpitations or shortness of breath. Risk factors for coronary artery disease include obesity and sedentary lifestyle. Current antihypertension treatment includes angiotensin blockers and calcium channel blockers. The current treatment provides significant improvement. There are no compliance problems.  There is no history of CAD/MI.       PHQ-2 Depression Screening  Little interest or pleasure in doing things?     Feeling down, depressed, or hopeless?     PHQ-2 Total Score     PHQ-9 Depression Screening  Little interest or pleasure in doing things?     Feeling down, depressed, or hopeless?     Trouble falling or staying asleep, or sleeping too much?     Feeling tired or having little energy?     Poor appetite or overeating?     Feeling bad about yourself - or that you are a failure or have let yourself or your family down?     Trouble concentrating on things, such as reading the newspaper or watching television?     Moving or speaking so slowly that other people could have noticed? Or the opposite - being so fidgety or restless that you have been moving around a lot more than usual?     Thoughts that you would be better off dead, or of hurting yourself in some way?     PHQ-9 Total Score     If you checked off any problems, how difficult have  these problems made it for you to do your work, take care of things at home, or get along with other people?       No Known Allergies    Prior to Admission medications    Medication Sig Start Date End Date Taking? Authorizing Provider   albuterol sulfate  (90 Base) MCG/ACT inhaler Inhale 2 puffs Every 6 (Six) Hours As Needed for Wheezing. 7/17/24  Yes Cuong Hickman DO   ALPRAZolam (XANAX) 0.5 MG tablet Take 1 tablet by mouth twice daily as needed for anxiety 3/12/25  Yes Cuong Hickman DO   amLODIPine (NORVASC) 10 MG tablet Take 1 tablet by mouth once daily 3/10/25  Yes Cuong Hickman DO   escitalopram (Lexapro) 20 MG tablet Take 1 tablet by mouth Daily. 12/10/24  Yes Cuong Hickman DO   hydroCHLOROthiazide 12.5 MG tablet Take 1 tablet by mouth Daily. 10/14/24  Yes Cuong Hickman DO   irbesartan (AVAPRO) 300 MG tablet Take 1 tablet by mouth Daily. 9/10/24  Yes Cuong Hickman DO   ondansetron ODT (ZOFRAN-ODT) 4 MG disintegrating tablet Place 1 tablet on the tongue Every 8 (Eight) Hours As Needed for Nausea or Vomiting. 7/1/24  Yes Cuong Hickman DO        Patient Active Problem List   Diagnosis    Encounter for medical examination to establish care    Hypertension    Screening for colon cancer    Encounter for screening mammogram for malignant neoplasm of breast    Tobacco abuse    Bronchitis    Situational anxiety        History reviewed. No pertinent surgical history.    Social History     Socioeconomic History    Marital status: Single   Tobacco Use    Smoking status: Every Day     Current packs/day: 0.50     Average packs/day: 0.5 packs/day for 30.0 years (15.0 ttl pk-yrs)     Types: Cigarettes    Smokeless tobacco: Never   Vaping Use    Vaping status: Never Used   Substance and Sexual Activity    Alcohol use: Yes     Alcohol/week: 7.0 standard drinks of alcohol     Types: 7 Standard drinks or equivalent per week     Comment: weekends  "   Drug use: Never    Sexual activity: Defer       History reviewed. No pertinent family history.    Family history, surgical history, past medical history, Allergies and meds reviewed with patient today and updated in [facility] EMR.     ROS:  Review of Systems   Constitutional:  Negative for fatigue.   HENT:  Negative for congestion, postnasal drip and rhinorrhea.    Eyes:  Negative for blurred vision and visual disturbance.   Respiratory:  Negative for cough, chest tightness, shortness of breath and wheezing.    Cardiovascular:  Negative for chest pain and palpitations.   Allergic/Immunologic: Negative for environmental allergies.   Neurological:  Negative for headache.   Psychiatric/Behavioral:  Positive for sleep disturbance. Negative for depressed mood. The patient is nervous/anxious.        OBJECTIVE:  Vitals:    03/18/25 1411   BP: 121/80   BP Location: Left arm   Patient Position: Sitting   Pulse: (!) 132   Temp: 98 °F (36.7 °C)   SpO2: 96%   Weight: 95.3 kg (210 lb)   Height: 175.3 cm (69\")     No results found.   Body mass index is 31.01 kg/m².  No LMP recorded. Patient is postmenopausal.    The 10-year ASCVD risk score (Vasquez DK, et al., 2019) is: 9.4%    Values used to calculate the score:      Age: 59 years      Sex: Female      Is Non- : No      Diabetic: No      Tobacco smoker: Yes      Systolic Blood Pressure: 121 mmHg      Is BP treated: Yes      HDL Cholesterol: 34 mg/dL      Total Cholesterol: 190 mg/dL     Physical Exam  Vitals and nursing note reviewed.   Constitutional:       General: She is not in acute distress.     Appearance: Normal appearance. She is obese.   HENT:      Head: Normocephalic.      Right Ear: Tympanic membrane, ear canal and external ear normal.      Left Ear: Tympanic membrane, ear canal and external ear normal.      Nose: Nose normal.      Mouth/Throat:      Mouth: Mucous membranes are moist.      Pharynx: Oropharynx is clear.   Eyes:      General: No " scleral icterus.     Conjunctiva/sclera: Conjunctivae normal.      Pupils: Pupils are equal, round, and reactive to light.   Cardiovascular:      Rate and Rhythm: Normal rate and regular rhythm.      Pulses: Normal pulses.      Heart sounds: Normal heart sounds. No murmur heard.  Pulmonary:      Effort: Pulmonary effort is normal.      Breath sounds: Normal breath sounds. No wheezing, rhonchi or rales.   Musculoskeletal:      Cervical back: Neck supple. No rigidity or tenderness.   Lymphadenopathy:      Cervical: No cervical adenopathy.   Skin:     General: Skin is warm and dry.      Coloration: Skin is not jaundiced.      Findings: No rash.   Neurological:      General: No focal deficit present.      Mental Status: She is alert and oriented to person, place, and time.   Psychiatric:         Mood and Affect: Mood normal.         Thought Content: Thought content normal.         Judgment: Judgment normal.         Procedures    No visits with results within 30 Day(s) from this visit.   Latest known visit with results is:   Office Visit on 07/16/2024   Component Date Value Ref Range Status    Amphetamine Screen, Urine 07/16/2024 Negative  Negative Final    AMP INTERNAL CONTROL 07/16/2024 Passed  Passed Final    Barbiturates Screen, Urine 07/16/2024 Negative  Negative Final    BARBITURATE INTERNAL CONTROL 07/16/2024 Passed  Passed Final    Buprenorphine, Screen, Urine 07/16/2024 Negative  Negative Final    BUPRENORPHINE INTERNAL CONTROL 07/16/2024 Passed  Passed Final    Benzodiazepine Screen, Urine 07/16/2024 Negative  Negative Final    BENZODIAZEPINE INTERNAL CONTROL 07/16/2024 Passed  Passed Final    Cocaine Screen, Urine 07/16/2024 Negative  Negative Final    COCAINE INTERNAL CONTROL 07/16/2024 Passed  Passed Final    MDMA (ECSTASY) 07/16/2024 Negative  Negative Final    MDMA (ECSTASY) INTERNAL CONTROL 07/16/2024 Passed  Passed Final    Methamphetamine, Ur 07/16/2024 Negative  Negative Final    METHAMPHETAMINE  INTERNAL CONTROL 07/16/2024 Passed  Passed Final    Morphine/Opiates Screen, Urine 07/16/2024 Negative  Negative Final    MOR INTERNAL CONTROL 07/16/2024 Passed  Passed Final    Methadone Screen, Urine 07/16/2024 Negative  Negative Final    METHADONE INTERNAL CONTROL 07/16/2024 Passed  Passed Final    Oxycodone Screen, Urine 07/16/2024 Negative  Negative Final    OXYCODONE INTERNAL CONTROL 07/16/2024 Passed  Passed Final    Phencyclidine (PCP), Urine 07/16/2024 Negative  Negative Final    PHENCYCLIDINE INTERNAL CONTROL 07/16/2024 Passed  Passed Final    THC, Screen, Urine 07/16/2024 Positive (A)  Negative Final    THC INTERNAL CONTROL 07/16/2024 Passed  Passed Final    Lot Number 07/16/2024 [id number]   Final    Expiration Date 07/16/2024 11/12/2025   Final       ASSESSMENT/ PLAN:    Diagnoses and all orders for this visit:    1. Primary hypertension (Primary)  Assessment & Plan:  Her blood pressure is very good here today.  Will continue her current meds and update her labs.    Orders:  -     Comprehensive Metabolic Panel  -     Lipid Panel    2. Encounter for screening for malignant neoplasm of colon  -     Ambulatory Referral For Screening Colonoscopy    3. Situational anxiety  Assessment & Plan:  Her anxiety is rather persistent.  We talked about attempting to reduce her alprazolam.  She feels at this time she is not ready to do that.  We encouraged her to meet up with friends to go out to attempt to be more social and friendly environments to join a winter wears club to get involved more with [employer] groups and activities.    Orders:  -     POC Medline 12 Panel Urine Drug Screen    4. Screening for colon cancer    5. Encounter for screening mammogram for malignant neoplasm of breast  -     Mammo Screening Digital Tomosynthesis Bilateral With CAD; Future    Other orders  -     Pneumococcal Conjugate Vaccine 20-Valent All  -     Tdap Vaccine => 8yo IM (BOOSTRIX/ADACEL)               Orders Placed Today:     No  orders of the defined types were placed in this encounter.       Management Plan:     An After Visit Summary was printed and given to the patient at discharge.    Follow-up: No follow-ups on file.    Cuong Hickman DO 3/18/2025 14:47 EDT  This note was electronically signed.

## 2025-03-18 NOTE — ASSESSMENT & PLAN NOTE
Her anxiety is rather persistent.  We talked about attempting to reduce her alprazolam.  She feels at this time she is not ready to do that.  We encouraged her to meet up with friends to go out to attempt to be more social and friendly environments to join a winter wears club to get involved more with [employer] groups and activities.

## 2025-03-19 ENCOUNTER — RESULTS FOLLOW-UP (OUTPATIENT)
Dept: FAMILY MEDICINE CLINIC | Facility: CLINIC | Age: 60
End: 2025-03-19

## 2025-03-19 DIAGNOSIS — N28.9 KIDNEY FUNCTION ABNORMAL: Primary | ICD-10-CM

## 2025-03-19 NOTE — TELEPHONE ENCOUNTER
Name: Amber Jurado    Relationship: Self    Best Callback Number: [phone]     HUB PROVIDED THE RELAY MESSAGE FROM THE OFFICE   PATIENT VOICED UNDERSTANDING AND HAS NO FURTHER QUESTIONS AT THIS TIME    ADDITIONAL INFORMATION:

## 2025-03-19 NOTE — TELEPHONE ENCOUNTER
"Relay     \"CBC is good  Lipid profile ok, but could be better. Recc lifestyle changes  CMP is good, except elevated kidney function. Needs to hydrate better and repeat BMP 1 week    \"                "

## 2025-04-11 DIAGNOSIS — F41.8 SITUATIONAL ANXIETY: ICD-10-CM

## 2025-04-11 RX ORDER — ALPRAZOLAM 0.5 MG
0.5 TABLET ORAL 2 TIMES DAILY PRN
Qty: 60 TABLET | Refills: 1 | Status: SHIPPED | OUTPATIENT
Start: 2025-04-11

## 2025-05-05 RX ORDER — IRBESARTAN 300 MG/1
300 TABLET ORAL DAILY
Qty: 90 TABLET | Refills: 0 | Status: SHIPPED | OUTPATIENT
Start: 2025-05-05

## 2025-05-07 ENCOUNTER — PREP FOR SURGERY (OUTPATIENT)
Dept: OTHER | Facility: HOSPITAL | Age: 60
End: 2025-05-07
Payer: COMMERCIAL

## 2025-05-07 ENCOUNTER — CLINICAL SUPPORT (OUTPATIENT)
Dept: GASTROENTEROLOGY | Facility: CLINIC | Age: 60
End: 2025-05-07
Payer: COMMERCIAL

## 2025-05-07 DIAGNOSIS — Z12.11 ENCOUNTER FOR SCREENING FOR MALIGNANT NEOPLASM OF COLON: Primary | ICD-10-CM

## 2025-05-07 RX ORDER — POLYETHYLENE GLYCOL 3350, SODIUM SULFATE ANHYDROUS, SODIUM BICARBONATE, SODIUM CHLORIDE, POTASSIUM CHLORIDE 236; 22.74; 6.74; 5.86; 2.97 G/4L; G/4L; G/4L; G/4L; G/4L
4 POWDER, FOR SOLUTION ORAL ONCE
Qty: 4000 ML | Refills: 0 | Status: SHIPPED | OUTPATIENT
Start: 2025-05-07 | End: 2025-05-07

## 2025-05-07 NOTE — PROGRESS NOTES
Amber Jurado  1965  59 y.o.    Reason for call: Screening Colonoscopy  If recall, please list diagnosis: na Please note this diagnosis should be entered in the case request  Prep prescribed: Nulytley  Prep instructions reviewed with patient and sent to patient via regular mail to the home address on file  Is the patient currently on any injectable or oral medications for weight loss or diabetes? No  Clearance needed? No  If yes, what clearance is needed? N/A  Clearance has been requested from n/a  The patient has been scheduled for: Colonoscopy    Amber Jurado is aware they have been scheduled for a screening colonoscopy. Patient has expressed they are not having any symptoms at all.         After your procedure, you will be contacted with results. Please confirm the best phone # to reach the patient: [phone]  Family history of colon cancer? No  If yes, indicate relative: na  Tentative Procedure Date: 7/14/20285    Date/Place of last Scope: N/A  Able to obtain report? no        Family History   Problem Relation Age of Onset    Colon cancer Neg Hx      Past Medical History:   Diagnosis Date    Hypertension      No Known Allergies  History reviewed. No pertinent surgical history.  Social History     Socioeconomic History    Marital status: Single   Tobacco Use    Smoking status: Every Day     Current packs/day: 0.50     Average packs/day: 0.5 packs/day for 30.0 years (15.0 ttl pk-yrs)     Types: Cigarettes    Smokeless tobacco: Never   Vaping Use    Vaping status: Never Used   Substance and Sexual Activity    Alcohol use: Yes     Alcohol/week: 7.0 standard drinks of alcohol     Types: 7 Standard drinks or equivalent per week     Comment: weekends    Drug use: Never    Sexual activity: Defer       Current Outpatient Medications:     ALPRAZolam (XANAX) 0.5 MG tablet, Take 1 tablet by mouth twice daily as needed for anxiety, Disp: 60 tablet, Rfl: 1    amLODIPine (NORVASC) 10 MG tablet, Take 1 tablet by  mouth once daily, Disp: 90 tablet, Rfl: 0    escitalopram (Lexapro) 20 MG tablet, Take 1 tablet by mouth Daily., Disp: 90 tablet, Rfl: 1    hydroCHLOROthiazide 12.5 MG tablet, Take 1 tablet by mouth Daily., Disp: 90 tablet, Rfl: 1    irbesartan (AVAPRO) 300 MG tablet, Take 1 tablet by mouth once daily, Disp: 90 tablet, Rfl: 0    ondansetron ODT (ZOFRAN-ODT) 4 MG disintegrating tablet, Place 1 tablet on the tongue Every 8 (Eight) Hours As Needed for Nausea or Vomiting., Disp: 20 tablet, Rfl: 0    albuterol sulfate  (90 Base) MCG/ACT inhaler, Inhale 2 puffs Every 6 (Six) Hours As Needed for Wheezing. (Patient not taking: Reported on 5/7/2025), Disp: 8 g, Rfl: 0

## 2025-06-05 RX ORDER — AMLODIPINE BESYLATE 10 MG/1
10 TABLET ORAL DAILY
Qty: 90 TABLET | Refills: 0 | Status: SHIPPED | OUTPATIENT
Start: 2025-06-05

## 2025-06-09 DIAGNOSIS — F41.8 SITUATIONAL ANXIETY: ICD-10-CM

## 2025-06-09 RX ORDER — ALPRAZOLAM 0.5 MG
0.5 TABLET ORAL 2 TIMES DAILY PRN
Qty: 60 TABLET | Refills: 0 | Status: SHIPPED | OUTPATIENT
Start: 2025-06-09

## 2025-06-19 ENCOUNTER — OFFICE VISIT (OUTPATIENT)
Dept: FAMILY MEDICINE CLINIC | Facility: CLINIC | Age: 60
End: 2025-06-19
Payer: COMMERCIAL

## 2025-06-19 VITALS
OXYGEN SATURATION: 97 % | HEIGHT: 69 IN | DIASTOLIC BLOOD PRESSURE: 75 MMHG | HEART RATE: 102 BPM | SYSTOLIC BLOOD PRESSURE: 117 MMHG | TEMPERATURE: 98 F | BODY MASS INDEX: 31.4 KG/M2 | WEIGHT: 212 LBS

## 2025-06-19 DIAGNOSIS — Z12.11 ENCOUNTER FOR SCREENING FOR MALIGNANT NEOPLASM OF COLON: ICD-10-CM

## 2025-06-19 DIAGNOSIS — I10 PRIMARY HYPERTENSION: Primary | ICD-10-CM

## 2025-06-19 DIAGNOSIS — F41.8 SITUATIONAL ANXIETY: ICD-10-CM

## 2025-06-19 RX ORDER — HYDROCHLOROTHIAZIDE 12.5 MG/1
12.5 TABLET ORAL DAILY
Qty: 90 TABLET | Refills: 1 | Status: SHIPPED | OUTPATIENT
Start: 2025-06-19

## 2025-06-19 RX ORDER — ESCITALOPRAM OXALATE 20 MG/1
20 TABLET ORAL DAILY
Qty: 90 TABLET | Refills: 1 | Status: SHIPPED | OUTPATIENT
Start: 2025-06-19

## 2025-06-19 NOTE — ASSESSMENT & PLAN NOTE
It is coming up on a year from her 's unexpected death.  Currently she is just using the alprazolam mostly at bedtime.  Will try and reducing it down slowly over the next couple months.  Hopefully by this fall we can hopefully have discontinued altogether.

## 2025-06-19 NOTE — PROGRESS NOTES
Chief Complaint   Patient presents with    Follow-up     3 month     Hypertension        Subjective     Amber Jurado  has a past medical history of Hypertension.    Anxiety-overall her anxiety is somewhat improved.  She is using less of her alprazolam.  She states her anxiety is primarily worse at bedtime and uses it primarily then.    Hypertension  Chronicity:  Chronic  Onset:  More than 1 year ago  Progression since onset:  Stable  Condition status:  Controlled  Associated symptoms: anxiety    Associated symptoms: no chest pain, no headaches and no shortness of breath    CAD risks:  Obesity and post-menopausal state  Current therapy:  Calcium channel blockers, diuretics and angiotensin blockers  Improvement on treatment:  Significant  Compliance problems:  No compliance problems  End-organ damage: no kidney disease and no CAD/MI        PHQ-2 Depression Screening  Little interest or pleasure in doing things?     Feeling down, depressed, or hopeless?     PHQ-2 Total Score     PHQ-9 Depression Screening  Little interest or pleasure in doing things?     Feeling down, depressed, or hopeless?     Trouble falling or staying asleep, or sleeping too much?     Feeling tired or having little energy?     Poor appetite or overeating?     Feeling bad about yourself - or that you are a failure or have let yourself or your family down?     Trouble concentrating on things, such as reading the newspaper or watching television?     Moving or speaking so slowly that other people could have noticed? Or the opposite - being so fidgety or restless that you have been moving around a lot more than usual?     Thoughts that you would be better off dead, or of hurting yourself in some way?     PHQ-9 Total Score     If you checked off any problems, how difficult have these problems made it for you to do your work, take care of things at home, or get along with other people?       No Known Allergies    Prior to Admission medications     Medication Sig Start Date End Date Taking? Authorizing Provider   albuterol sulfate  (90 Base) MCG/ACT inhaler Inhale 2 puffs Every 6 (Six) Hours As Needed for Wheezing. 7/17/24  Yes Cuong Hickman DO   ALPRAZolam (XANAX) 0.5 MG tablet Take 1 tablet by mouth twice daily as needed for anxiety 6/9/25  Yes Cuong Hickman DO   amLODIPine (NORVASC) 10 MG tablet Take 1 tablet by mouth once daily 6/5/25  Yes Cuong Hickman DO   escitalopram (Lexapro) 20 MG tablet Take 1 tablet by mouth Daily. 12/10/24  Yes Cuong Hickman DO   hydroCHLOROthiazide 12.5 MG tablet Take 1 tablet by mouth Daily. 10/14/24  Yes Cuong Hickman DO   irbesartan (AVAPRO) 300 MG tablet Take 1 tablet by mouth once daily 5/5/25  Yes Cuong Hickman DO   ondansetron ODT (ZOFRAN-ODT) 4 MG disintegrating tablet Place 1 tablet on the tongue Every 8 (Eight) Hours As Needed for Nausea or Vomiting. 7/1/24  Yes Cuong Hickman DO        Patient Active Problem List   Diagnosis    Encounter for medical examination to establish care    Hypertension    Screening for colon cancer    Encounter for screening mammogram for malignant neoplasm of breast    Tobacco abuse    Bronchitis    Situational anxiety    Encounter for screening for malignant neoplasm of colon        History reviewed. No pertinent surgical history.    Social History     Socioeconomic History    Marital status: Single   Tobacco Use    Smoking status: Every Day     Current packs/day: 0.50     Average packs/day: 0.5 packs/day for 30.0 years (15.0 ttl pk-yrs)     Types: Cigarettes    Smokeless tobacco: Never   Vaping Use    Vaping status: Never Used   Substance and Sexual Activity    Alcohol use: Yes     Alcohol/week: 7.0 standard drinks of alcohol     Types: 7 Standard drinks or equivalent per week     Comment: weekends    Drug use: Never    Sexual activity: Defer       Family History   Problem Relation Age of Onset    Colon  "cancer Neg Hx        Family history, surgical history, past medical history, Allergies and meds reviewed with patient today and updated in [employer] EMR.     ROS:  Review of Systems   Constitutional:  Negative for fatigue.   Respiratory:  Negative for cough, chest tightness, shortness of breath and wheezing.    Cardiovascular:  Negative for chest pain.   Neurological:  Negative for headache.   Psychiatric/Behavioral:  Positive for sleep disturbance. The patient is nervous/anxious.        OBJECTIVE:  Vitals:    06/19/25 1400   BP: 117/75   BP Location: Left arm   Patient Position: Sitting   Pulse: 102   Temp: 98 °F (36.7 °C)   SpO2: 97%   Weight: 96.2 kg (212 lb)   Height: 175.3 cm (69\")     No results found.   Body mass index is 31.31 kg/m².  No LMP recorded. Patient is postmenopausal.    The 10-year ASCVD risk score (Vasquez CHAVEZ, et al., 2019) is: 9.3%    Values used to calculate the score:      Age: 59 years      Sex: Female      Is Non- : No      Diabetic: No      Tobacco smoker: Yes      Systolic Blood Pressure: 117 mmHg      Is BP treated: Yes      HDL Cholesterol: 33 mg/dL      Total Cholesterol: 198 mg/dL     Physical Exam  Vitals and nursing note reviewed.   Constitutional:       General: She is not in acute distress.     Appearance: Normal appearance. She is obese.   HENT:      Head: Normocephalic.   Cardiovascular:      Rate and Rhythm: Normal rate and regular rhythm.      Heart sounds: Normal heart sounds. No murmur heard.  Pulmonary:      Effort: Pulmonary effort is normal.      Breath sounds: Normal breath sounds. No wheezing or rhonchi.   Neurological:      Mental Status: She is alert and oriented to person, place, and time.   Psychiatric:         Mood and Affect: Mood normal.         Thought Content: Thought content normal.         Judgment: Judgment normal.         Procedures    No visits with results within 30 Day(s) from this visit.   Latest known visit with results is:   Office " Visit on 03/18/2025   Component Date Value Ref Range Status    Amphetamine Screen, Urine 03/18/2025 Negative  Negative Final    AMP INTERNAL CONTROL 03/18/2025 Passed  Passed Final    Barbiturates Screen, Urine 03/18/2025 Negative  Negative Final    BARBITURATE INTERNAL CONTROL 03/18/2025 Passed  Passed Final    Buprenorphine, Screen, Urine 03/18/2025 Negative  Negative Final    BUPRENORPHINE INTERNAL CONTROL 03/18/2025 Passed  Passed Final    Benzodiazepine Screen, Urine 03/18/2025 Positive (A)  Negative Final    BENZODIAZEPINE INTERNAL CONTROL 03/18/2025 Passed  Passed Final    Cocaine Screen, Urine 03/18/2025 Negative  Negative Final    COCAINE INTERNAL CONTROL 03/18/2025 Passed  Passed Final    MDMA (ECSTASY) 03/18/2025 Negative  Negative Final    MDMA (ECSTASY) INTERNAL CONTROL 03/18/2025 Passed  Passed Final    Methamphetamine, Ur 03/18/2025 Negative  Negative Final    METHAMPHETAMINE INTERNAL CONTROL 03/18/2025 Passed  Passed Final    Morphine/Opiates Screen, Urine 03/18/2025 Negative  Negative Final    MOR INTERNAL CONTROL 03/18/2025 Passed  Passed Final    Methadone Screen, Urine 03/18/2025 Negative  Negative Final    METHADONE INTERNAL CONTROL 03/18/2025 Passed  Passed Final    Oxycodone Screen, Urine 03/18/2025 Negative  Negative Final    OXYCODONE INTERNAL CONTROL 03/18/2025 Passed  Passed Final    Phencyclidine (PCP), Urine 03/18/2025 Negative  Negative Final    PHENCYCLIDINE INTERNAL CONTROL 03/18/2025 Passed  Passed Final    THC, Screen, Urine 03/18/2025 Positive (A)  Negative Final    THC INTERNAL CONTROL 03/18/2025 Passed  Passed Final    Lot Number 03/18/2025 [id number]   Final    Expiration Date 03/18/2025 10/14/2026   Final       ASSESSMENT/ PLAN:    Diagnoses and all orders for this visit:    1. Primary hypertension (Primary)  Assessment & Plan:  Her blood pressure is great here today.  Will continue her current meds.      2. Encounter for screening for malignant neoplasm of colon    3.  Situational anxiety  Assessment & Plan:  It is coming up on a year from her 's unexpected death.  Currently she is just using the alprazolam mostly at bedtime.  Will try and reducing it down slowly over the next couple months.  Hopefully by this fall we can hopefully have discontinued altogether.      Other orders  -     escitalopram (Lexapro) 20 MG tablet; Take 1 tablet by mouth Daily.  Dispense: 90 tablet; Refill: 1  -     hydroCHLOROthiazide 12.5 MG tablet; Take 1 tablet by mouth Daily.  Dispense: 90 tablet; Refill: 1        BMI is >= 30 and <35. (Class 1 Obesity). The following options were offered after discussion;: exercise counseling/recommendations and nutrition counseling/recommendations      Orders Placed Today:     New Medications Ordered This Visit   Medications    escitalopram (Lexapro) 20 MG tablet     Sig: Take 1 tablet by mouth Daily.     Dispense:  90 tablet     Refill:  1    hydroCHLOROthiazide 12.5 MG tablet     Sig: Take 1 tablet by mouth Daily.     Dispense:  90 tablet     Refill:  1        Management Plan:     An After Visit Summary was printed and given to the patient at discharge.    Follow-up: Return in about 3 months (around 9/19/2025).    Cuong Hickman, DO 6/19/2025 14:23 EDT  This note was electronically signed.

## 2025-07-02 NOTE — PAT
Pt returned phone call, verifies will be here for procedure, verbalizes understanding of instructions.

## 2025-07-02 NOTE — PAT
Left message on voicemail with arrival time of  0800.    Come to [facility], entrance [address]. Bring picture ID and insurance card, have  over 18 to give ride home.     Bring medication list but do not take any meds except inhalers that morning. Bring medications with you to the hospital, including inhalers.     Complete prep prior to arrival. Clear liquids all day the day before, and start prep as instructed.     Complete prep and any water may need to drink at least 2 hours prior to arrival. No gum, mints, water, or anything else in mouth after that.      Plan to be here at least 3-4 hours. Do not bring any valuables with you to the hospital.     Call [phone] with any questions.

## 2025-07-08 DIAGNOSIS — F41.8 SITUATIONAL ANXIETY: ICD-10-CM

## 2025-07-08 RX ORDER — ALPRAZOLAM 0.5 MG
0.5 TABLET ORAL 2 TIMES DAILY PRN
Qty: 45 TABLET | Refills: 0 | Status: SHIPPED | OUTPATIENT
Start: 2025-07-08

## 2025-07-10 ENCOUNTER — ANESTHESIA EVENT (OUTPATIENT)
Dept: GASTROENTEROLOGY | Facility: HOSPITAL | Age: 60
End: 2025-07-10
Payer: COMMERCIAL

## 2025-07-10 RX ORDER — SODIUM CHLORIDE, SODIUM LACTATE, POTASSIUM CHLORIDE, CALCIUM CHLORIDE 600; 310; 30; 20 MG/100ML; MG/100ML; MG/100ML; MG/100ML
30 INJECTION, SOLUTION INTRAVENOUS CONTINUOUS
Status: CANCELLED | OUTPATIENT
Start: 2025-07-10 | End: 2025-07-10

## 2025-07-10 NOTE — ANESTHESIA PREPROCEDURE EVALUATION
Anesthesia Evaluation     Patient summary reviewed and Nursing notes reviewed   NPO Solid Status: > 8 hours  NPO Liquid Status: > 4 hours           Airway   Mallampati: II  TM distance: <3 FB  Neck ROM: full  Possible difficult intubation and Large neck circumference  Dental    (+) poor dentition and partials    Comment: Lower partials removed prior to procedure     Pulmonary     breath sounds clear to auscultation  (+) a smoker (current) Current, Smoked day of surgery, cigarettes,  Cardiovascular - normal exam  Exercise tolerance: good (4-7 METS)    Rhythm: regular  Rate: normal    (+) hypertension well controlled      Neuro/Psych  (+) psychiatric history Anxiety  GI/Hepatic/Renal/Endo    (+) obesity    Musculoskeletal     Abdominal   (+) obese    Abdomen: soft.   Substance History      OB/GYN          Other        ROS/Med Hx Other: Screening    No EKG on file    S/p menopausal                     Anesthesia Plan    ASA 2     general   total IV anesthesia  (Total IV Anesthesia    Patient understands anesthesia not responsible for dental damage.      Discussed risks with pt including aspiration, allergic reactions, apnea, advanced airway placement. Pt verbalized understanding. All questions answered.     )  intravenous induction     Anesthetic plan, risks, benefits, and alternatives have been provided, discussed and informed consent has been obtained with: patient.  Pre-procedure education provided  Plan discussed with CRNA.      CODE STATUS:

## 2025-07-14 ENCOUNTER — RESULTS FOLLOW-UP (OUTPATIENT)
Dept: GASTROENTEROLOGY | Facility: HOSPITAL | Age: 60
End: 2025-07-14
Payer: COMMERCIAL

## 2025-07-14 ENCOUNTER — ANESTHESIA (OUTPATIENT)
Dept: GASTROENTEROLOGY | Facility: HOSPITAL | Age: 60
End: 2025-07-14
Payer: COMMERCIAL

## 2025-07-14 ENCOUNTER — HOSPITAL ENCOUNTER (OUTPATIENT)
Facility: HOSPITAL | Age: 60
Setting detail: HOSPITAL OUTPATIENT SURGERY
Discharge: HOME | End: 2025-07-14
Attending: INTERNAL MEDICINE | Admitting: INTERNAL MEDICINE
Payer: COMMERCIAL

## 2025-07-14 VITALS
WEIGHT: 210.1 LBS | DIASTOLIC BLOOD PRESSURE: 85 MMHG | SYSTOLIC BLOOD PRESSURE: 132 MMHG | BODY MASS INDEX: 31.03 KG/M2 | HEART RATE: 75 BPM | OXYGEN SATURATION: 92 % | RESPIRATION RATE: 15 BRPM | TEMPERATURE: 97.5 F

## 2025-07-14 DIAGNOSIS — D12.6 TUBULAR ADENOMA OF COLON: Primary | ICD-10-CM

## 2025-07-14 DIAGNOSIS — Z12.11 ENCOUNTER FOR SCREENING FOR MALIGNANT NEOPLASM OF COLON: ICD-10-CM

## 2025-07-14 PROCEDURE — 25810000003 LACTATED RINGERS PER 1000 ML

## 2025-07-14 PROCEDURE — 25010000002 PROPOFOL 10 MG/ML EMULSION

## 2025-07-14 PROCEDURE — 25010000002 LIDOCAINE PF 2% 2 % SOLUTION

## 2025-07-14 PROCEDURE — 88305 TISSUE EXAM BY PATHOLOGIST: CPT | Performed by: INTERNAL MEDICINE

## 2025-07-14 DEVICE — DEV CLIP ENDO RESOLUTION360 CONTRL ROT 235CM: Type: IMPLANTABLE DEVICE | Site: ASCENDING COLON | Status: FUNCTIONAL

## 2025-07-14 RX ORDER — PROPOFOL 10 MG/ML
VIAL (ML) INTRAVENOUS AS NEEDED
Status: DISCONTINUED | OUTPATIENT
Start: 2025-07-14 | End: 2025-07-14 | Stop reason: SURG

## 2025-07-14 RX ORDER — SODIUM CHLORIDE, SODIUM LACTATE, POTASSIUM CHLORIDE, CALCIUM CHLORIDE 600; 310; 30; 20 MG/100ML; MG/100ML; MG/100ML; MG/100ML
INJECTION, SOLUTION INTRAVENOUS CONTINUOUS PRN
Status: DISCONTINUED | OUTPATIENT
Start: 2025-07-14 | End: 2025-07-14 | Stop reason: SURG

## 2025-07-14 RX ORDER — ERYTHROMYCIN 5 MG/G
OINTMENT OPHTHALMIC ONCE
Status: COMPLETED | OUTPATIENT
Start: 2025-07-14 | End: 2025-07-14

## 2025-07-14 RX ORDER — LIDOCAINE HYDROCHLORIDE 20 MG/ML
INJECTION, SOLUTION EPIDURAL; INFILTRATION; INTRACAUDAL; PERINEURAL AS NEEDED
Status: DISCONTINUED | OUTPATIENT
Start: 2025-07-14 | End: 2025-07-14 | Stop reason: SURG

## 2025-07-14 RX ADMIN — PROPOFOL 90 MG: 10 INJECTION, EMULSION INTRAVENOUS at 09:39

## 2025-07-14 RX ADMIN — PROPOFOL 200 MCG/KG/MIN: 10 INJECTION, EMULSION INTRAVENOUS at 09:40

## 2025-07-14 RX ADMIN — SODIUM CHLORIDE, POTASSIUM CHLORIDE, SODIUM LACTATE AND CALCIUM CHLORIDE: 600; 310; 30; 20 INJECTION, SOLUTION INTRAVENOUS at 09:36

## 2025-07-14 RX ADMIN — LIDOCAINE HYDROCHLORIDE 60 MG: 20 INJECTION, SOLUTION INTRAVENOUS at 09:39

## 2025-07-14 RX ADMIN — ERYTHROMYCIN: 5 OINTMENT OPHTHALMIC at 11:05

## 2025-07-14 NOTE — H&P
Pre Procedure History & Physical    Chief Complaint: Colonoscopy    HPI: Patient is 59 years old female with known history of hypertension presented today for open access average risk screening colonoscopy.  She has never had colonoscopy before.  Patient denies family history of colorectal cancer or colon polyps.  She does not take any blood thinner or anticoagulant or NSAID medications.  No obvious GI symptoms of bleed, altered bowel habits or weight loss    Past Medical History:   Past Medical History:   Diagnosis Date    Hypertension        Past Surgical History:  No past surgical history on file.    Family History:  Family History   Problem Relation Age of Onset    Colon cancer Neg Hx        Social History:   reports that she has been smoking cigarettes. She has a 15 pack-year smoking history. She has never used smokeless tobacco. She reports current alcohol use of about 7.0 standard drinks of alcohol per week. She reports that she does not use drugs.    Medications:   Medications Prior to Admission   Medication Sig Dispense Refill Last Dose/Taking    albuterol sulfate  (90 Base) MCG/ACT inhaler Inhale 2 puffs Every 6 (Six) Hours As Needed for Wheezing. 8 g 0     ALPRAZolam (XANAX) 0.5 MG tablet Take 1 tablet by mouth twice daily as needed for anxiety 45 tablet 0     amLODIPine (NORVASC) 10 MG tablet Take 1 tablet by mouth once daily 90 tablet 0     escitalopram (Lexapro) 20 MG tablet Take 1 tablet by mouth Daily. 90 tablet 1     hydroCHLOROthiazide 12.5 MG tablet Take 1 tablet by mouth Daily. 90 tablet 1     irbesartan (AVAPRO) 300 MG tablet Take 1 tablet by mouth once daily 90 tablet 0     ondansetron ODT (ZOFRAN-ODT) 4 MG disintegrating tablet Place 1 tablet on the tongue Every 8 (Eight) Hours As Needed for Nausea or Vomiting. 20 tablet 0        Allergies:  Patient has no known allergies.      Objective     Weight 95.3 kg (210 lb 1.6 oz).    Physical Exam   Constitutional: Pt is oriented to person,  place, and time and well-developed, well-nourished, and in no distress.   Mouth/Throat: Oropharynx is clear and moist.   Neck: Normal range of motion.   Cardiovascular: Normal rate, regular rhythm and normal heart sounds.    Pulmonary/Chest: Effort normal and breath sounds normal.   Abdominal: Soft. Nontender  Skin: Skin is warm and dry.   Psychiatric: Mood, memory, affect and judgment normal.     Assessment & Plan     Diagnosis    Average risk screening colonoscopy    Anticipated Surgical Procedure:    Colonoscopy    The risks, benefits, and alternatives of this procedure have been discussed with the patient or the responsible party- the patient understands and agrees to proceed.        Electronically signed by Yg Kay MD, 07/14/25, 8:55 AM EDT.

## 2025-07-14 NOTE — ANESTHESIA POSTPROCEDURE EVALUATION
Patient: Amber Jurado    Procedure Summary       Date: 07/14/25 Room / Location: [facility] ENDOSCOPY 3 / [facility] ENDOSCOPY    Anesthesia Start: 0936 Anesthesia Stop: 1036    Procedure: COLONOSCOPY with eleview injection, hot/cold snare polypectomies, clip x1 Diagnosis:       Encounter for screening for malignant neoplasm of colon      (Encounter for screening for malignant neoplasm of colon [Z12.11])    Surgeons: Yg Kay MD Provider: Johana Munson CRNA    Anesthesia Type: general ASA Status: 2            Anesthesia Type: general    Vitals  Vitals Value Taken Time   /90 07/14/25 10:55   Temp 36.4 °C (97.5 °F) 07/14/25 10:50   Pulse 77 07/14/25 10:57   Resp 15 07/14/25 10:50   SpO2 95 % 07/14/25 10:57   Vitals shown include unfiled device data.        Post Anesthesia Care and Evaluation    Post-procedure mental status: acceptable.  Pain management: satisfactory to patient    Airway patency: patent  Anesthetic complications: No anesthetic complications    Cardiovascular status: acceptable  Respiratory status: acceptable    Comments: Per chart review

## 2025-07-14 NOTE — ANESTHESIA POSTPROCEDURE EVALUATION
Patient: Amber Jurado    Procedure Summary       Date: 07/14/25 Room / Location: [facility] ENDOSCOPY 3 / [facility] ENDOSCOPY    Anesthesia Start: 0936 Anesthesia Stop: 1036    Procedure: COLONOSCOPY with eleview injection, hot/cold snare polypectomies, clip x1 Diagnosis:       Encounter for screening for malignant neoplasm of colon      (Encounter for screening for malignant neoplasm of colon [Z12.11])    Surgeons: Yg Kay MD Provider: Johana Munson CRNA    Anesthesia Type: general ASA Status: 2            Anesthesia Type: general    Vitals  Vitals Value Taken Time   /85 07/14/25 10:50   Temp 36.5 °C (97.7 °F) 07/14/25 10:35   Pulse 80 07/14/25 10:54   Resp 16 07/14/25 10:45   SpO2 96 % 07/14/25 10:54   Vitals shown include unfiled device data.        Anesthesia Post Evaluation

## 2025-07-15 ENCOUNTER — PREP FOR SURGERY (OUTPATIENT)
Dept: OTHER | Facility: HOSPITAL | Age: 60
End: 2025-07-15
Payer: COMMERCIAL

## 2025-07-15 DIAGNOSIS — D36.9 TUBULAR ADENOMA: Primary | ICD-10-CM

## 2025-07-15 LAB
CYTO UR: NORMAL
LAB AP CASE REPORT: NORMAL
LAB AP CLINICAL INFORMATION: NORMAL
PATH REPORT.FINAL DX SPEC: NORMAL
PATH REPORT.GROSS SPEC: NORMAL

## 2025-07-16 NOTE — TELEPHONE ENCOUNTER
I was working the missing info and before noting in the case request I just want to verify.. Is the patient declining to schedule altogether or just waiting until closer to time and reaching out to us when she gets recall letter?

## 2025-07-30 DIAGNOSIS — F41.8 SITUATIONAL ANXIETY: ICD-10-CM

## 2025-07-30 RX ORDER — ALPRAZOLAM 0.5 MG
0.5 TABLET ORAL 2 TIMES DAILY PRN
Qty: 40 TABLET | Refills: 0 | Status: SHIPPED | OUTPATIENT
Start: 2025-07-30

## 2025-08-01 RX ORDER — IRBESARTAN 300 MG/1
300 TABLET ORAL DAILY
Qty: 90 TABLET | Refills: 0 | Status: SHIPPED | OUTPATIENT
Start: 2025-08-01

## 2025-08-18 DIAGNOSIS — F41.8 SITUATIONAL ANXIETY: ICD-10-CM

## 2025-08-20 DIAGNOSIS — F41.8 SITUATIONAL ANXIETY: ICD-10-CM

## 2025-08-20 RX ORDER — ALPRAZOLAM 0.5 MG
0.5 TABLET ORAL 2 TIMES DAILY PRN
Qty: 40 TABLET | Refills: 0 | OUTPATIENT
Start: 2025-08-20

## 2025-08-22 RX ORDER — ALPRAZOLAM 0.5 MG
0.5 TABLET ORAL 2 TIMES DAILY PRN
Qty: 40 TABLET | Refills: 0 | Status: SHIPPED | OUTPATIENT
Start: 2025-08-22

## (undated) DEVICE — Device: Brand: SINGLE USE INJECTOR NM600/610

## (undated) DEVICE — DEFENDO AIR WATER SUCTION AND BIOPSY VALVE KIT FOR  OLYMPUS: Brand: DEFENDO AIR/WATER/SUCTION AND BIOPSY VALVE

## (undated) DEVICE — Device

## (undated) DEVICE — SINGLE-USE BIOPSY FORCEPS: Brand: RADIAL JAW 4

## (undated) DEVICE — SOLIDIFIER LIQLOC PLS 1500CC BT

## (undated) DEVICE — SUREFIT, DUAL DISPERSIVE ELECTRODE, CONTACT QUALITY MONITOR: Brand: SUREFIT

## (undated) DEVICE — THE SINGLE USE ETRAP – POLYP TRAP IS USED FOR SUCTION RETRIEVAL OF ENDOSCOPICALLY REMOVED POLYPS.: Brand: ETRAP

## (undated) DEVICE — SOL IRRG H2O PL/BG 1000ML STRL

## (undated) DEVICE — INJ SUB/MUCUS ELEVIEW FOR/GI/ENDO/PROC AMPL/10ML

## (undated) DEVICE — SNAR E/S POLYP SNAREMASTER OVL/10MM 2.8X2300MM YEL

## (undated) DEVICE — SNAR POLYP CAPTIVATOR MICROHEX 13 240CM

## (undated) DEVICE — THE STERILE LIGHT HANDLE COVER IS USED WITH STERIS SURGICAL LIGHTING AND VISUALIZATION SYSTEMS.

## (undated) DEVICE — DISPOSABLE DISTAL ATTACHMENT: Brand: DISPOSABLE DISTAL ATTACHMENT